# Patient Record
Sex: MALE | Race: BLACK OR AFRICAN AMERICAN | Employment: UNEMPLOYED | URBAN - METROPOLITAN AREA
[De-identification: names, ages, dates, MRNs, and addresses within clinical notes are randomized per-mention and may not be internally consistent; named-entity substitution may affect disease eponyms.]

---

## 2020-07-02 ENCOUNTER — ANESTHESIA EVENT (OUTPATIENT)
Dept: OPERATING ROOM | Age: 75
DRG: 026 | End: 2020-07-02
Payer: MEDICARE

## 2020-07-02 ENCOUNTER — HOSPITAL ENCOUNTER (INPATIENT)
Age: 75
LOS: 5 days | Discharge: HOME HEALTH | DRG: 026 | End: 2020-07-07
Attending: EMERGENCY MEDICINE | Admitting: FAMILY MEDICINE
Payer: MEDICARE

## 2020-07-02 ENCOUNTER — APPOINTMENT (OUTPATIENT)
Dept: GENERAL RADIOLOGY | Age: 75
DRG: 026 | End: 2020-07-02
Payer: MEDICARE

## 2020-07-02 ENCOUNTER — APPOINTMENT (OUTPATIENT)
Dept: CT IMAGING | Age: 75
DRG: 026 | End: 2020-07-02
Payer: MEDICARE

## 2020-07-02 ENCOUNTER — ANESTHESIA (OUTPATIENT)
Dept: OPERATING ROOM | Age: 75
DRG: 026 | End: 2020-07-02
Payer: MEDICARE

## 2020-07-02 VITALS — SYSTOLIC BLOOD PRESSURE: 148 MMHG | TEMPERATURE: 96.4 F | OXYGEN SATURATION: 100 % | DIASTOLIC BLOOD PRESSURE: 63 MMHG

## 2020-07-02 PROBLEM — S06.5XAA SUBDURAL HEMATOMA: Status: ACTIVE | Noted: 2020-07-02

## 2020-07-02 LAB
ABO/RH: NORMAL
ALBUMIN SERPL-MCNC: 3.9 G/DL (ref 3.5–5.2)
ALP BLD-CCNC: 61 U/L (ref 40–129)
ALT SERPL-CCNC: 11 U/L (ref 0–40)
ANGLE (CLOT STRENGTH): 70.2 DEGREE (ref 59–74)
ANION GAP SERPL CALCULATED.3IONS-SCNC: 12 MMOL/L (ref 7–16)
ANION GAP SERPL CALCULATED.3IONS-SCNC: 14 MMOL/L (ref 7–16)
ANTIBODY SCREEN: NORMAL
AST SERPL-CCNC: 14 U/L (ref 0–39)
BACTERIA: ABNORMAL /HPF
BASOPHILS ABSOLUTE: 0.02 E9/L (ref 0–0.2)
BASOPHILS RELATIVE PERCENT: 0.2 % (ref 0–2)
BILIRUB SERPL-MCNC: 0.3 MG/DL (ref 0–1.2)
BILIRUBIN URINE: NEGATIVE
BLOOD, URINE: NEGATIVE
BUN BLDV-MCNC: 18 MG/DL (ref 8–23)
BUN BLDV-MCNC: 19 MG/DL (ref 8–23)
CALCIUM SERPL-MCNC: 9.3 MG/DL (ref 8.6–10.2)
CALCIUM SERPL-MCNC: 9.6 MG/DL (ref 8.6–10.2)
CHLORIDE BLD-SCNC: 101 MMOL/L (ref 98–107)
CHLORIDE BLD-SCNC: 102 MMOL/L (ref 98–107)
CHOLESTEROL, TOTAL: 183 MG/DL (ref 0–199)
CLARITY: CLEAR
CO2: 21 MMOL/L (ref 22–29)
CO2: 24 MMOL/L (ref 22–29)
COLOR: YELLOW
CREAT SERPL-MCNC: 1 MG/DL (ref 0.7–1.2)
CREAT SERPL-MCNC: 1.2 MG/DL (ref 0.7–1.2)
EKG ATRIAL RATE: 55 BPM
EKG P AXIS: 68 DEGREES
EKG P-R INTERVAL: 146 MS
EKG Q-T INTERVAL: 464 MS
EKG QRS DURATION: 78 MS
EKG QTC CALCULATION (BAZETT): 443 MS
EKG R AXIS: 19 DEGREES
EKG T AXIS: 3 DEGREES
EKG VENTRICULAR RATE: 55 BPM
EOSINOPHILS ABSOLUTE: 0.09 E9/L (ref 0.05–0.5)
EOSINOPHILS RELATIVE PERCENT: 1.1 % (ref 0–6)
EPITHELIAL CELLS, UA: ABNORMAL /HPF
EPL-TEG: 0.1 % (ref 0–15)
G-TEG: 9.8 K D/SC (ref 4.5–11)
GFR AFRICAN AMERICAN: >60
GFR AFRICAN AMERICAN: >60
GFR NON-AFRICAN AMERICAN: >60 ML/MIN/1.73
GFR NON-AFRICAN AMERICAN: >60 ML/MIN/1.73
GLUCOSE BLD-MCNC: 104 MG/DL (ref 74–99)
GLUCOSE BLD-MCNC: 122 MG/DL (ref 74–99)
GLUCOSE URINE: NEGATIVE MG/DL
HBA1C MFR BLD: 5.8 % (ref 4–5.6)
HCT VFR BLD CALC: 36.5 % (ref 37–54)
HDLC SERPL-MCNC: 63 MG/DL
HEMOGLOBIN: 11.7 G/DL (ref 12.5–16.5)
IMMATURE GRANULOCYTES #: 0.01 E9/L
IMMATURE GRANULOCYTES %: 0.1 % (ref 0–5)
INR BLD: 1.1
K (CLOTTING TIME): 1.4 MIN (ref 1–3)
KETONES, URINE: NEGATIVE MG/DL
LDL CHOLESTEROL CALCULATED: 106 MG/DL (ref 0–99)
LEUKOCYTE ESTERASE, URINE: ABNORMAL
LY30 (FIBRINOLYSIS): 0.1 % (ref 0–8)
LYMPHOCYTES ABSOLUTE: 2.43 E9/L (ref 1.5–4)
LYMPHOCYTES RELATIVE PERCENT: 29.8 % (ref 20–42)
MA (MAX AMPLITUDE): 66.1 MM (ref 50–70)
MAGNESIUM: 1.9 MG/DL (ref 1.6–2.6)
MCH RBC QN AUTO: 29.2 PG (ref 26–35)
MCHC RBC AUTO-ENTMCNC: 32.1 % (ref 32–34.5)
MCV RBC AUTO: 91 FL (ref 80–99.9)
MONOCYTES ABSOLUTE: 0.58 E9/L (ref 0.1–0.95)
MONOCYTES RELATIVE PERCENT: 7.1 % (ref 2–12)
NEUTROPHILS ABSOLUTE: 5.03 E9/L (ref 1.8–7.3)
NEUTROPHILS RELATIVE PERCENT: 61.7 % (ref 43–80)
NITRITE, URINE: NEGATIVE
PDW BLD-RTO: 14.1 FL (ref 11.5–15)
PH UA: 6 (ref 5–9)
PHOSPHORUS: 3.3 MG/DL (ref 2.5–4.5)
PLATELET # BLD: 185 E9/L (ref 130–450)
PMV BLD AUTO: 9.8 FL (ref 7–12)
POTASSIUM SERPL-SCNC: 3.6 MMOL/L (ref 3.5–5)
POTASSIUM SERPL-SCNC: 3.9 MMOL/L (ref 3.5–5)
PROTEIN UA: NEGATIVE MG/DL
PROTHROMBIN TIME: 12.7 SEC (ref 9.3–12.4)
R (REACTION TIME): 4.5 MIN (ref 5–10)
RBC # BLD: 4.01 E12/L (ref 3.8–5.8)
RBC UA: ABNORMAL /HPF (ref 0–2)
REASON FOR REJECTION: NORMAL
REJECTED TEST: NORMAL
SODIUM BLD-SCNC: 137 MMOL/L (ref 132–146)
SODIUM BLD-SCNC: 137 MMOL/L (ref 132–146)
SPECIFIC GRAVITY UA: 1.02 (ref 1–1.03)
TOTAL CK: 104 U/L (ref 20–200)
TOTAL CK: 106 U/L (ref 20–200)
TOTAL PROTEIN: 6.8 G/DL (ref 6.4–8.3)
TRIGL SERPL-MCNC: 71 MG/DL (ref 0–149)
TROPONIN: <0.01 NG/ML (ref 0–0.03)
UROBILINOGEN, URINE: 0.2 E.U./DL
VLDLC SERPL CALC-MCNC: 14 MG/DL
WBC # BLD: 8.2 E9/L (ref 4.5–11.5)
WBC UA: ABNORMAL /HPF (ref 0–5)

## 2020-07-02 PROCEDURE — 2500000003 HC RX 250 WO HCPCS: Performed by: NEUROLOGICAL SURGERY

## 2020-07-02 PROCEDURE — 6370000000 HC RX 637 (ALT 250 FOR IP): Performed by: NURSE PRACTITIONER

## 2020-07-02 PROCEDURE — C1713 ANCHOR/SCREW BN/BN,TIS/BN: HCPCS | Performed by: NEUROLOGICAL SURGERY

## 2020-07-02 PROCEDURE — 2580000003 HC RX 258

## 2020-07-02 PROCEDURE — 84100 ASSAY OF PHOSPHORUS: CPT

## 2020-07-02 PROCEDURE — 2709999900 HC NON-CHARGEABLE SUPPLY: Performed by: NEUROLOGICAL SURGERY

## 2020-07-02 PROCEDURE — 2500000003 HC RX 250 WO HCPCS

## 2020-07-02 PROCEDURE — 93005 ELECTROCARDIOGRAM TRACING: CPT | Performed by: EMERGENCY MEDICINE

## 2020-07-02 PROCEDURE — 6360000002 HC RX W HCPCS

## 2020-07-02 PROCEDURE — 6360000002 HC RX W HCPCS: Performed by: FAMILY MEDICINE

## 2020-07-02 PROCEDURE — 81001 URINALYSIS AUTO W/SCOPE: CPT

## 2020-07-02 PROCEDURE — 6360000002 HC RX W HCPCS: Performed by: NURSE PRACTITIONER

## 2020-07-02 PROCEDURE — 2000000000 HC ICU R&B

## 2020-07-02 PROCEDURE — 86850 RBC ANTIBODY SCREEN: CPT

## 2020-07-02 PROCEDURE — 6360000002 HC RX W HCPCS: Performed by: NEUROLOGICAL SURGERY

## 2020-07-02 PROCEDURE — 2720000010 HC SURG SUPPLY STERILE: Performed by: NEUROLOGICAL SURGERY

## 2020-07-02 PROCEDURE — 3600000015 HC SURGERY LEVEL 5 ADDTL 15MIN: Performed by: NEUROLOGICAL SURGERY

## 2020-07-02 PROCEDURE — 3600000005 HC SURGERY LEVEL 5 BASE: Performed by: NEUROLOGICAL SURGERY

## 2020-07-02 PROCEDURE — 7100000001 HC PACU RECOVERY - ADDTL 15 MIN

## 2020-07-02 PROCEDURE — 80061 LIPID PANEL: CPT

## 2020-07-02 PROCEDURE — 99291 CRITICAL CARE FIRST HOUR: CPT | Performed by: SURGERY

## 2020-07-02 PROCEDURE — 80048 BASIC METABOLIC PNL TOTAL CA: CPT

## 2020-07-02 PROCEDURE — 85384 FIBRINOGEN ACTIVITY: CPT

## 2020-07-02 PROCEDURE — 97530 THERAPEUTIC ACTIVITIES: CPT

## 2020-07-02 PROCEDURE — 2500000003 HC RX 250 WO HCPCS: Performed by: NURSE PRACTITIONER

## 2020-07-02 PROCEDURE — 70450 CT HEAD/BRAIN W/O DYE: CPT

## 2020-07-02 PROCEDURE — 6370000000 HC RX 637 (ALT 250 FOR IP): Performed by: NEUROLOGICAL SURGERY

## 2020-07-02 PROCEDURE — 72170 X-RAY EXAM OF PELVIS: CPT

## 2020-07-02 PROCEDURE — 83036 HEMOGLOBIN GLYCOSYLATED A1C: CPT

## 2020-07-02 PROCEDURE — 86900 BLOOD TYPING SEROLOGIC ABO: CPT

## 2020-07-02 PROCEDURE — 71045 X-RAY EXAM CHEST 1 VIEW: CPT

## 2020-07-02 PROCEDURE — 2580000003 HC RX 258: Performed by: FAMILY MEDICINE

## 2020-07-02 PROCEDURE — 97162 PT EVAL MOD COMPLEX 30 MIN: CPT

## 2020-07-02 PROCEDURE — 7100000000 HC PACU RECOVERY - FIRST 15 MIN

## 2020-07-02 PROCEDURE — 82550 ASSAY OF CK (CPK): CPT

## 2020-07-02 PROCEDURE — 87081 CULTURE SCREEN ONLY: CPT

## 2020-07-02 PROCEDURE — 85025 COMPLETE CBC W/AUTO DIFF WBC: CPT

## 2020-07-02 PROCEDURE — 97166 OT EVAL MOD COMPLEX 45 MIN: CPT

## 2020-07-02 PROCEDURE — 85576 BLOOD PLATELET AGGREGATION: CPT

## 2020-07-02 PROCEDURE — 85347 COAGULATION TIME ACTIVATED: CPT

## 2020-07-02 PROCEDURE — 36415 COLL VENOUS BLD VENIPUNCTURE: CPT

## 2020-07-02 PROCEDURE — 3700000001 HC ADD 15 MINUTES (ANESTHESIA): Performed by: NEUROLOGICAL SURGERY

## 2020-07-02 PROCEDURE — 2580000003 HC RX 258: Performed by: NEUROLOGICAL SURGERY

## 2020-07-02 PROCEDURE — 2580000003 HC RX 258: Performed by: NURSE PRACTITIONER

## 2020-07-02 PROCEDURE — 86901 BLOOD TYPING SEROLOGIC RH(D): CPT

## 2020-07-02 PROCEDURE — 009430Z DRAINAGE OF INTRACRANIAL SUBDURAL SPACE WITH DRAINAGE DEVICE, PERCUTANEOUS APPROACH: ICD-10-PCS | Performed by: NEUROLOGICAL SURGERY

## 2020-07-02 PROCEDURE — 6370000000 HC RX 637 (ALT 250 FOR IP): Performed by: STUDENT IN AN ORGANIZED HEALTH CARE EDUCATION/TRAINING PROGRAM

## 2020-07-02 PROCEDURE — 36592 COLLECT BLOOD FROM PICC: CPT

## 2020-07-02 PROCEDURE — 72125 CT NECK SPINE W/O DYE: CPT

## 2020-07-02 PROCEDURE — 2580000003 HC RX 258: Performed by: EMERGENCY MEDICINE

## 2020-07-02 PROCEDURE — 3700000000 HC ANESTHESIA ATTENDED CARE: Performed by: NEUROLOGICAL SURGERY

## 2020-07-02 PROCEDURE — 85610 PROTHROMBIN TIME: CPT

## 2020-07-02 PROCEDURE — 99291 CRITICAL CARE FIRST HOUR: CPT

## 2020-07-02 PROCEDURE — 84484 ASSAY OF TROPONIN QUANT: CPT

## 2020-07-02 PROCEDURE — 80053 COMPREHEN METABOLIC PANEL: CPT

## 2020-07-02 PROCEDURE — 93010 ELECTROCARDIOGRAM REPORT: CPT | Performed by: INTERNAL MEDICINE

## 2020-07-02 PROCEDURE — 83735 ASSAY OF MAGNESIUM: CPT

## 2020-07-02 DEVICE — LOW PROFILE BURR HOLE COVER, W/TAB, 10MM
Type: IMPLANTABLE DEVICE | Site: CRANIAL | Status: FUNCTIONAL
Brand: UNIVERSAL NEURO 2

## 2020-07-02 DEVICE — SCREW, AXS, SELF-TAPPING
Type: IMPLANTABLE DEVICE | Site: CRANIAL | Status: FUNCTIONAL
Brand: UNIVERSAL NEURO 3

## 2020-07-02 RX ORDER — CEFAZOLIN SODIUM 1 G/3ML
INJECTION, POWDER, FOR SOLUTION INTRAMUSCULAR; INTRAVENOUS PRN
Status: DISCONTINUED | OUTPATIENT
Start: 2020-07-02 | End: 2020-07-02 | Stop reason: SDUPTHER

## 2020-07-02 RX ORDER — ONDANSETRON 2 MG/ML
4 INJECTION INTRAMUSCULAR; INTRAVENOUS EVERY 6 HOURS PRN
Status: DISCONTINUED | OUTPATIENT
Start: 2020-07-02 | End: 2020-07-07 | Stop reason: HOSPADM

## 2020-07-02 RX ORDER — HYDROCHLOROTHIAZIDE 12.5 MG/1
12.5 CAPSULE, GELATIN COATED ORAL DAILY
COMMUNITY

## 2020-07-02 RX ORDER — GLYCOPYRROLATE 1 MG/5 ML
SYRINGE (ML) INTRAVENOUS PRN
Status: DISCONTINUED | OUTPATIENT
Start: 2020-07-02 | End: 2020-07-02 | Stop reason: SDUPTHER

## 2020-07-02 RX ORDER — LIDOCAINE HYDROCHLORIDE AND EPINEPHRINE 10; 10 MG/ML; UG/ML
INJECTION, SOLUTION INFILTRATION; PERINEURAL PRN
Status: DISCONTINUED | OUTPATIENT
Start: 2020-07-02 | End: 2020-07-02 | Stop reason: ALTCHOICE

## 2020-07-02 RX ORDER — SODIUM CHLORIDE 9 MG/ML
INJECTION, SOLUTION INTRAVENOUS CONTINUOUS
Status: DISCONTINUED | OUTPATIENT
Start: 2020-07-02 | End: 2020-07-02

## 2020-07-02 RX ORDER — LEVETIRACETAM 500 MG/1
500 TABLET ORAL 2 TIMES DAILY
Status: DISCONTINUED | OUTPATIENT
Start: 2020-07-02 | End: 2020-07-07 | Stop reason: HOSPADM

## 2020-07-02 RX ORDER — HYDROCHLOROTHIAZIDE 25 MG/1
25 TABLET ORAL DAILY
Status: DISCONTINUED | OUTPATIENT
Start: 2020-07-02 | End: 2020-07-02

## 2020-07-02 RX ORDER — VANCOMYCIN HYDROCHLORIDE 500 MG/10ML
INJECTION, POWDER, LYOPHILIZED, FOR SOLUTION INTRAVENOUS PRN
Status: DISCONTINUED | OUTPATIENT
Start: 2020-07-02 | End: 2020-07-02 | Stop reason: ALTCHOICE

## 2020-07-02 RX ORDER — SODIUM CHLORIDE 0.9 % (FLUSH) 0.9 %
10 SYRINGE (ML) INJECTION PRN
Status: DISCONTINUED | OUTPATIENT
Start: 2020-07-02 | End: 2020-07-07 | Stop reason: HOSPADM

## 2020-07-02 RX ORDER — DEXTROSE AND SODIUM CHLORIDE 5; .45 G/100ML; G/100ML
INJECTION, SOLUTION INTRAVENOUS CONTINUOUS
Status: DISCONTINUED | OUTPATIENT
Start: 2020-07-02 | End: 2020-07-02

## 2020-07-02 RX ORDER — THIAMINE MONONITRATE (VIT B1) 100 MG
100 TABLET ORAL DAILY
Status: DISCONTINUED | OUTPATIENT
Start: 2020-07-02 | End: 2020-07-07 | Stop reason: HOSPADM

## 2020-07-02 RX ORDER — LISINOPRIL 5 MG/1
5 TABLET ORAL DAILY
COMMUNITY

## 2020-07-02 RX ORDER — HYDRALAZINE HYDROCHLORIDE 20 MG/ML
10 INJECTION INTRAMUSCULAR; INTRAVENOUS EVERY 10 MIN PRN
Status: DISCONTINUED | OUTPATIENT
Start: 2020-07-02 | End: 2020-07-07 | Stop reason: HOSPADM

## 2020-07-02 RX ORDER — LEVETIRACETAM 5 MG/ML
500 INJECTION INTRAVASCULAR EVERY 12 HOURS
Status: DISCONTINUED | OUTPATIENT
Start: 2020-07-02 | End: 2020-07-02 | Stop reason: SDUPTHER

## 2020-07-02 RX ORDER — DEXTROSE MONOHYDRATE 25 G/50ML
12.5 INJECTION, SOLUTION INTRAVENOUS PRN
Status: DISCONTINUED | OUTPATIENT
Start: 2020-07-02 | End: 2020-07-07 | Stop reason: HOSPADM

## 2020-07-02 RX ORDER — ACETAMINOPHEN 325 MG/1
650 TABLET ORAL EVERY 4 HOURS PRN
Status: DISCONTINUED | OUTPATIENT
Start: 2020-07-02 | End: 2020-07-07 | Stop reason: HOSPADM

## 2020-07-02 RX ORDER — FENTANYL CITRATE 50 UG/ML
INJECTION, SOLUTION INTRAMUSCULAR; INTRAVENOUS PRN
Status: DISCONTINUED | OUTPATIENT
Start: 2020-07-02 | End: 2020-07-02 | Stop reason: SDUPTHER

## 2020-07-02 RX ORDER — LIDOCAINE HYDROCHLORIDE 20 MG/ML
INJECTION, SOLUTION INTRAVENOUS PRN
Status: DISCONTINUED | OUTPATIENT
Start: 2020-07-02 | End: 2020-07-02 | Stop reason: SDUPTHER

## 2020-07-02 RX ORDER — LABETALOL HYDROCHLORIDE 5 MG/ML
10 INJECTION, SOLUTION INTRAVENOUS EVERY 10 MIN PRN
Status: DISCONTINUED | OUTPATIENT
Start: 2020-07-02 | End: 2020-07-07 | Stop reason: HOSPADM

## 2020-07-02 RX ORDER — HYDRALAZINE HYDROCHLORIDE 20 MG/ML
5 INJECTION INTRAMUSCULAR; INTRAVENOUS EVERY 4 HOURS PRN
Status: DISCONTINUED | OUTPATIENT
Start: 2020-07-02 | End: 2020-07-02

## 2020-07-02 RX ORDER — LISINOPRIL 20 MG/1
20 TABLET ORAL DAILY
Status: DISCONTINUED | OUTPATIENT
Start: 2020-07-02 | End: 2020-07-07 | Stop reason: HOSPADM

## 2020-07-02 RX ORDER — LEVETIRACETAM 5 MG/ML
500 INJECTION INTRAVASCULAR EVERY 12 HOURS
Status: DISCONTINUED | OUTPATIENT
Start: 2020-07-02 | End: 2020-07-02

## 2020-07-02 RX ORDER — DEXTROSE MONOHYDRATE 50 MG/ML
100 INJECTION, SOLUTION INTRAVENOUS PRN
Status: DISCONTINUED | OUTPATIENT
Start: 2020-07-02 | End: 2020-07-07 | Stop reason: HOSPADM

## 2020-07-02 RX ORDER — NICOTINE POLACRILEX 4 MG
15 LOZENGE BUCCAL PRN
Status: DISCONTINUED | OUTPATIENT
Start: 2020-07-02 | End: 2020-07-07 | Stop reason: HOSPADM

## 2020-07-02 RX ORDER — ROCURONIUM BROMIDE 10 MG/ML
INJECTION, SOLUTION INTRAVENOUS PRN
Status: DISCONTINUED | OUTPATIENT
Start: 2020-07-02 | End: 2020-07-02 | Stop reason: SDUPTHER

## 2020-07-02 RX ORDER — PROMETHAZINE HYDROCHLORIDE 25 MG/1
12.5 TABLET ORAL EVERY 6 HOURS PRN
Status: DISCONTINUED | OUTPATIENT
Start: 2020-07-02 | End: 2020-07-02

## 2020-07-02 RX ORDER — FOLIC ACID 1 MG/1
1 TABLET ORAL DAILY
Status: DISCONTINUED | OUTPATIENT
Start: 2020-07-02 | End: 2020-07-07 | Stop reason: HOSPADM

## 2020-07-02 RX ORDER — DEXAMETHASONE SODIUM PHOSPHATE 10 MG/ML
INJECTION INTRAMUSCULAR; INTRAVENOUS PRN
Status: DISCONTINUED | OUTPATIENT
Start: 2020-07-02 | End: 2020-07-02 | Stop reason: SDUPTHER

## 2020-07-02 RX ORDER — SODIUM CHLORIDE 9 MG/ML
INJECTION, SOLUTION INTRAVENOUS CONTINUOUS PRN
Status: DISCONTINUED | OUTPATIENT
Start: 2020-07-02 | End: 2020-07-02 | Stop reason: SDUPTHER

## 2020-07-02 RX ORDER — NEOSTIGMINE METHYLSULFATE 1 MG/ML
INJECTION, SOLUTION INTRAVENOUS PRN
Status: DISCONTINUED | OUTPATIENT
Start: 2020-07-02 | End: 2020-07-02 | Stop reason: SDUPTHER

## 2020-07-02 RX ORDER — LEVETIRACETAM 10 MG/ML
1000 INJECTION INTRAVASCULAR ONCE
Status: COMPLETED | OUTPATIENT
Start: 2020-07-02 | End: 2020-07-02

## 2020-07-02 RX ORDER — PROPOFOL 10 MG/ML
INJECTION, EMULSION INTRAVENOUS PRN
Status: DISCONTINUED | OUTPATIENT
Start: 2020-07-02 | End: 2020-07-02 | Stop reason: SDUPTHER

## 2020-07-02 RX ORDER — SODIUM CHLORIDE, SODIUM LACTATE, POTASSIUM CHLORIDE, CALCIUM CHLORIDE 600; 310; 30; 20 MG/100ML; MG/100ML; MG/100ML; MG/100ML
INJECTION, SOLUTION INTRAVENOUS CONTINUOUS PRN
Status: COMPLETED | OUTPATIENT
Start: 2020-07-02 | End: 2020-07-02

## 2020-07-02 RX ORDER — DOCUSATE SODIUM 100 MG/1
100 CAPSULE, LIQUID FILLED ORAL DAILY
Status: DISCONTINUED | OUTPATIENT
Start: 2020-07-02 | End: 2020-07-04

## 2020-07-02 RX ORDER — DIAPER,BRIEF,INFANT-TODD,DISP
EACH MISCELLANEOUS PRN
Status: DISCONTINUED | OUTPATIENT
Start: 2020-07-02 | End: 2020-07-02 | Stop reason: ALTCHOICE

## 2020-07-02 RX ORDER — SODIUM CHLORIDE 0.9 % (FLUSH) 0.9 %
10 SYRINGE (ML) INJECTION EVERY 12 HOURS SCHEDULED
Status: DISCONTINUED | OUTPATIENT
Start: 2020-07-02 | End: 2020-07-07 | Stop reason: HOSPADM

## 2020-07-02 RX ORDER — PANTOPRAZOLE SODIUM 40 MG/1
40 TABLET, DELAYED RELEASE ORAL
Status: DISCONTINUED | OUTPATIENT
Start: 2020-07-03 | End: 2020-07-07 | Stop reason: HOSPADM

## 2020-07-02 RX ORDER — IPRATROPIUM BROMIDE AND ALBUTEROL SULFATE 2.5; .5 MG/3ML; MG/3ML
1 SOLUTION RESPIRATORY (INHALATION) EVERY 4 HOURS PRN
Status: DISCONTINUED | OUTPATIENT
Start: 2020-07-02 | End: 2020-07-07 | Stop reason: HOSPADM

## 2020-07-02 RX ORDER — ONDANSETRON 2 MG/ML
INJECTION INTRAMUSCULAR; INTRAVENOUS PRN
Status: DISCONTINUED | OUTPATIENT
Start: 2020-07-02 | End: 2020-07-02 | Stop reason: SDUPTHER

## 2020-07-02 RX ADMIN — DEXAMETHASONE SODIUM PHOSPHATE 10 MG: 10 INJECTION INTRAMUSCULAR; INTRAVENOUS at 06:29

## 2020-07-02 RX ADMIN — SODIUM CHLORIDE: 9 INJECTION, SOLUTION INTRAVENOUS at 06:21

## 2020-07-02 RX ADMIN — ONDANSETRON HYDROCHLORIDE 4 MG: 2 INJECTION, SOLUTION INTRAMUSCULAR; INTRAVENOUS at 07:12

## 2020-07-02 RX ADMIN — Medication 100 MG: at 10:17

## 2020-07-02 RX ADMIN — LEVETIRACETAM 1000 MG: 10 INJECTION INTRAVENOUS at 08:13

## 2020-07-02 RX ADMIN — SODIUM CHLORIDE: 9 INJECTION, SOLUTION INTRAVENOUS at 08:52

## 2020-07-02 RX ADMIN — SODIUM CHLORIDE, PRESERVATIVE FREE 10 ML: 5 INJECTION INTRAVENOUS at 08:55

## 2020-07-02 RX ADMIN — ACETAMINOPHEN 650 MG: 325 TABLET ORAL at 15:56

## 2020-07-02 RX ADMIN — FOLIC ACID 1 MG: 1 TABLET ORAL at 10:16

## 2020-07-02 RX ADMIN — CEFAZOLIN 2000 MG: 1 INJECTION, POWDER, FOR SOLUTION INTRAMUSCULAR; INTRAVENOUS at 06:40

## 2020-07-02 RX ADMIN — SODIUM CHLORIDE, PRESERVATIVE FREE 10 ML: 5 INJECTION INTRAVENOUS at 20:33

## 2020-07-02 RX ADMIN — LIDOCAINE HYDROCHLORIDE 80 MG: 20 INJECTION, SOLUTION INTRAVENOUS at 06:29

## 2020-07-02 RX ADMIN — ACETAMINOPHEN 650 MG: 325 TABLET ORAL at 19:50

## 2020-07-02 RX ADMIN — CEFAZOLIN 1 G: 1 INJECTION, POWDER, FOR SOLUTION INTRAMUSCULAR; INTRAVENOUS at 15:48

## 2020-07-02 RX ADMIN — LEVETIRACETAM 500 MG: 500 TABLET, FILM COATED ORAL at 20:34

## 2020-07-02 RX ADMIN — FENTANYL CITRATE 100 MCG: 50 INJECTION, SOLUTION INTRAMUSCULAR; INTRAVENOUS at 06:29

## 2020-07-02 RX ADMIN — HYDRALAZINE HYDROCHLORIDE 10 MG: 20 INJECTION INTRAMUSCULAR; INTRAVENOUS at 23:09

## 2020-07-02 RX ADMIN — Medication 0.6 MG: at 07:13

## 2020-07-02 RX ADMIN — ROCURONIUM BROMIDE 30 MG: 10 INJECTION, SOLUTION INTRAVENOUS at 06:29

## 2020-07-02 RX ADMIN — Medication 0.2 MG: at 06:29

## 2020-07-02 RX ADMIN — HYDROCHLOROTHIAZIDE 25 MG: 25 TABLET ORAL at 10:17

## 2020-07-02 RX ADMIN — Medication 3 MG: at 07:13

## 2020-07-02 RX ADMIN — HYDRALAZINE HYDROCHLORIDE 10 MG: 20 INJECTION INTRAMUSCULAR; INTRAVENOUS at 11:22

## 2020-07-02 RX ADMIN — PROPOFOL 120 MG: 10 INJECTION, EMULSION INTRAVENOUS at 06:29

## 2020-07-02 ASSESSMENT — PULMONARY FUNCTION TESTS
PIF_VALUE: 16
PIF_VALUE: 16
PIF_VALUE: 0
PIF_VALUE: 15
PIF_VALUE: 15
PIF_VALUE: 1
PIF_VALUE: 0
PIF_VALUE: 16
PIF_VALUE: 16
PIF_VALUE: 15
PIF_VALUE: 16
PIF_VALUE: 0
PIF_VALUE: 16
PIF_VALUE: 16
PIF_VALUE: 1
PIF_VALUE: 16
PIF_VALUE: 0
PIF_VALUE: 0
PIF_VALUE: 16
PIF_VALUE: 29
PIF_VALUE: 1
PIF_VALUE: 0
PIF_VALUE: 0
PIF_VALUE: 16
PIF_VALUE: 15
PIF_VALUE: 0
PIF_VALUE: 16
PIF_VALUE: 3
PIF_VALUE: 16
PIF_VALUE: 16
PIF_VALUE: 15
PIF_VALUE: 5
PIF_VALUE: 0
PIF_VALUE: 0
PIF_VALUE: 15
PIF_VALUE: 16
PIF_VALUE: 15
PIF_VALUE: 3
PIF_VALUE: 16
PIF_VALUE: 1
PIF_VALUE: 1
PIF_VALUE: 16
PIF_VALUE: 0
PIF_VALUE: 0
PIF_VALUE: 3
PIF_VALUE: 16
PIF_VALUE: 2
PIF_VALUE: 16
PIF_VALUE: 0
PIF_VALUE: 16
PIF_VALUE: 16
PIF_VALUE: 0
PIF_VALUE: 15
PIF_VALUE: 16
PIF_VALUE: 20
PIF_VALUE: 16
PIF_VALUE: 0
PIF_VALUE: 15
PIF_VALUE: 16
PIF_VALUE: 0
PIF_VALUE: 15
PIF_VALUE: 28
PIF_VALUE: 16
PIF_VALUE: 16
PIF_VALUE: 15
PIF_VALUE: 1
PIF_VALUE: 0
PIF_VALUE: 1
PIF_VALUE: 16
PIF_VALUE: 16
PIF_VALUE: 0
PIF_VALUE: 16

## 2020-07-02 ASSESSMENT — PAIN DESCRIPTION - PAIN TYPE: TYPE: ACUTE PAIN;SURGICAL PAIN

## 2020-07-02 ASSESSMENT — PAIN SCALES - GENERAL
PAINLEVEL_OUTOF10: 4
PAINLEVEL_OUTOF10: 10
PAINLEVEL_OUTOF10: 10

## 2020-07-02 ASSESSMENT — PAIN DESCRIPTION - LOCATION: LOCATION: HEAD

## 2020-07-02 NOTE — PLAN OF CARE
Problem: Skin Integrity:  Goal: Will show no infection signs and symptoms  Description: Will show no infection signs and symptoms  Outcome: Met This Shift  Goal: Absence of new skin breakdown  Description: Absence of new skin breakdown  Outcome: Met This Shift     Problem: Falls - Risk of:  Goal: Will remain free from falls  Description: Will remain free from falls  Outcome: Met This Shift  Goal: Absence of physical injury  Description: Absence of physical injury  Outcome: Met This Shift     Problem: Discharge Planning:  Goal: Participates in care planning  Description: Participates in care planning  Outcome: Met This Shift     Problem: Airway Clearance - Ineffective:  Goal: Ability to maintain a clear airway will improve  Description: Ability to maintain a clear airway will improve  Outcome: Met This Shift     Problem: Anxiety/Stress:  Goal: Level of anxiety will decrease  Description: Level of anxiety will decrease  Outcome: Met This Shift     Problem: Aspiration:  Goal: Absence of aspiration  Description: Absence of aspiration  Outcome: Met This Shift     Problem:  Bowel Function - Altered:  Goal: Bowel elimination is within specified parameters  Description: Bowel elimination is within specified parameters  Outcome: Met This Shift     Problem: Cardiac Output - Decreased:  Goal: Hemodynamic stability will improve  Description: Hemodynamic stability will improve  Outcome: Met This Shift     Problem: Fluid Volume - Imbalance:  Goal: Absence of imbalanced fluid volume signs and symptoms  Description: Absence of imbalanced fluid volume signs and symptoms  Outcome: Met This Shift     Problem: Gas Exchange - Impaired:  Goal: Levels of oxygenation will improve  Description: Levels of oxygenation will improve  Outcome: Met This Shift     Problem: Mental Status - Impaired:  Goal: Mental status will be restored to baseline  Description: Mental status will be restored to baseline  Outcome: Met This Shift     Problem: Skin Integrity - Impaired:  Goal: Will show no infection signs and symptoms  Description: Will show no infection signs and symptoms  Outcome: Met This Shift  Goal: Absence of new skin breakdown  Description: Absence of new skin breakdown  Outcome: Met This Shift     Problem: Sleep Pattern Disturbance:  Goal: Appears well-rested  Description: Appears well-rested  Outcome: Met This Shift

## 2020-07-02 NOTE — CONSULTS
TRAUMA CONSULT NOTE  Surgical Resident/Advance Practice Nurse  7/2/2020  3:49 AM    PRIMARY SURVEY    CHIEF COMPLAINT:  Trauma consult. Injury occurred unknown time frame. Patient was last seen by his neighbors 4 days ago. Today they went to check on him and he did not answer door. EMS called. Found to have expressive aphasia, right sided facial droop and RUE and RLE weakness. Patient's PMH is unknown. He is able to answer \"yes\" and \"no\" seemingly appropriately to questions but difficult to give more than a 1 word answer. He intially presented to [facility], was found to have large L septated SDH with mixed density and 12mm L to R shift with acute on subacute components. Patient denies fall in past 24hrs. He thinks he may have fallen sometime in the past 4 days but is not sure. When asked, thinks he may have had a fall several weeks ago. Denies taking blood thinners. Unknown LOC. Patient transferred to [facility] for neurosurgical evaluation. Dr. Geeta Hernández already notified @ OSH. Patient received 1g [facility] @ 7806. CBC:  WBC 8.1, Hgb 13.7, Plt 220  BUN 8/Cr 1.17, Na 139, K 3.6, Glucose 106     AIRWAY:   Airway Normal  EMS ETT Absent  Noisy respirations Absent  Retractions: Absent  Vomiting/bleeding: Absent      BREATHING:    Midaxillary breath sound left:  Normal  Midaxillary breath sound right:  Normal    Cough sound intensity:  good   FiO2: on room air  [facility] .       CIRCULATION:   Femerol pulse intensity: Strong  Palpebral conjunctiva: Pink       Vitals:    07/02/20 0338   BP: (!) 155/73   Pulse: 62   Resp: 15   Temp:    SpO2: 96%       Vitals:    07/02/20 0302 07/02/20 0338   BP: (!) 157/84 (!) 155/73   Pulse: 58 62   Resp: 16 15   Temp: 97.4 °F (36.3 °C)    SpO2: 98% 96%        FAST EXAM: not performed    Central Nervous System    GCS Initial 15 minutes   Eye  Motor  Verbal 4 - Opens eyes on own  6 - Follows simple motor commands  4 - Seems confused, disoriented 4 - Opens eyes on own  6 - Follows simple motor commands  4 - Seems confused, disoriented     Neuromuscular blockade: No  Pupil size:  Left 2 mm    Right 2 mm  Pupil reaction: Yes    Wiggles fingers: Left Yes Right Yes, weakly  Wiggles toes: Left Yes   Right No    Hand grasp:   Left  Present      Right  Weak  Plantar flexion: Left  Present      Right   Absent    Loss of consciousness:  unknown  History Obtained From:  Patient & EMS  Private Medical Doctor: unknown    Pre-exisiting Medical History:  Unknown, patient denies taking medicines    Conditions: unknown    Medications: says \"no\" to taking medication, blood thinners    Allergies: unknown    Social History:   Tobacco use:  Admits to smoking, unclear how much 2/2 expressive aphasia  Alcohol use:  history unreliable, admits to drinking several days weekly but states not daily  Illicit drug use:  no history of illicit drug use    Past Surgical History:  Possible prior appendectomy, scars from laparoscopic inguinal hernia repair    Anticoagulant use:  No   Antiplatelet use:    No     NSAID use in last 72 hours: denies  Taken PCN in past:  unknown  Last food/drink: unknown  Last tetanus: unknown    Family History:   Not pertinent to presenting problem. Complaints:   Head:  None  Neck:   None  Chest:   None  Back:   None  Abdomen:   None  Extremities:   None  Comments: denies physical complaints of pain    Review of systems:  All negative unless otherwise noted. SECONDARY SURVEY  Head/scalp: Atraumatic    Face: Atraumatic    Eyes/ears/nose: Atraumatic    Pharynx/mouth: Atraumatic    Neck: Atraumatic     Cervical spine tenderness:   Cervical collar not indicated  Pain:  none  ROM:  Not indicated     Chest wall:  Atraumatic    Heart:  Regular rate & rhythm    Abdomen: Atraumatic. Soft ND  Tenderness:  none    Pelvis: Atraumatic  Tenderness: none    Thoracolumbar spine: Atraumatic  Tenderness:  none    Genitourinary:  Atraumatic. No blood or urine noted    Rectum: Atraumatic.   No blood noted. Perineum: Atraumatic. No blood or urine noted. Extremities:   Sensory normal  Motor abnormal: decreased strength to RUE 3/5. Decreased strength to RLE 1/5    Distal Pulses  Left arm normal  Right arm normal  Left leg normal  Right leg normal    Capillary refill  Left arm normal  Right arm normal  Left leg normal  Right leg normal    Procedures in ED:  none    In the event of Emergency Blood Transfusion:  Due to the critical condition of this patient, I request the immediate release of blood products for emergency transfusion secondary to shock. I understand the increased risks incurred by the lack of complete transfusion testing. Radiology: Chest Xray , Pelvic Xray , CT Head  and CT Cervical spine     Consultations:  Neurosurgery, additional consultants pending further scans    Admission/Diagnosis: SDH, likely subacute    Plan of Treatment:  Patient does not have any signs of acute traumatic injury. He denies fall or injury within the past 24hrs.   Consult Medicine for admission  Pending repeat CT head  Received Keppra 1g IV @ 0144, continue BID  NSG consult, Honorio Manzanares already aware and planning for possible OR in am  CXR, PXR and CT Cspine to complete trauma evaluation      Plan discussed with Dr. Ander Soto at 7/2/2020 on 3:49 AM    Electronically signed by Sheila Torres DO on 7/2/2020 at 3:49 AM

## 2020-07-02 NOTE — H&P
for DM, CAD, Cancer, CVA. Positive as follows\"  No family history on file. REVIEW OF SYSTEMS:   Pertinent positives as noted in the HPI. All other systems reviewed and negative. PHYSICAL EXAM:  BP (!) 142/75   Pulse 55   Temp 97.6 °F (36.4 °C) (Oral)   Resp 15   Ht 6' 2\" (1.88 m)   Wt 160 lb (72.6 kg)   SpO2 100%   BMI 20.54 kg/m²   General appearance: No acute distress, breathing without difficulty  HEENT: Normal cephalic, atraumatic. EOMI. Neck: Supple, trachea midline  Respiratory: Clear to auscultation bilaterally  Cardiovascular: Regular rate and rhythm  Abdomen: Soft, nontender, nondistended  Musculoskeletal: Normal range of motion no deformities  Skin: Normal skin color. No rashes or lesions. Neurologic: Alert and oriented. Confused to recent events. No focal neurological deficits identified  Psychiatric: Alert and oriented, thought content appropriate, normal insight    CBC:   Recent Labs     07/02/20  0306   WBC 8.2   RBC 4.01   HGB 11.7*   HCT 36.5*   MCV 91.0   RDW 14.1        BMP:   Recent Labs     07/02/20  0306      K 3.9      CO2 24   BUN 19   CREATININE 1.2     LFT:  Recent Labs     07/02/20  0306   PROT 6.8   ALKPHOS 61   ALT 11   AST 14   BILITOT 0.3     CE:  Recent Labs     07/02/20  0306   CKTOTAL 106   TROPONINI <0.01     PT/INR:   Recent Labs     07/02/20  0306   INR 1.1     BNP: No results for input(s): BNP in the last 72 hours.   ESR: No results found for: SEDRATE  CRP: No results found for: CRP  D Dimer: No results found for: DDIMER   Folate and B12: No results found for: TQEIJHEX03, No results found for: FOLATE  Lactic Acid: No results found for: LACTA  Thyroid Studies: No results found for: TSH, O5JIIBS, A2JQAXG, THYROIDAB    Oupatient labs:  Lab Results   Component Value Date    INR 1.1 07/02/2020    LABA1C 5.8 (H) 07/02/2020       Urinalysis:  No results found for: NITRU, WBCUA, BACTERIA, RBCUA, BLOODU, SPECGRAV, GLUCOSEU    Imaging:  Xr Pelvis (1-2 Views)    Result Date: 2020  Patient MRN:  [id number] : 1945 Age: 70 years Gender: Male Order Date:  2020 4:15 AM EXAM: XR PELVIS (1-2 VIEWS) NUMBER OF IMAGES:  1 INDICATION:  Acute fall with pelvic trauma. SDH SDH COMPARISON: None FINDINGS: The bones are aligned anatomically. No evidence of fracture or dislocation. Normal mineralization. Soft tissues unremarkable. Pelvis is rotated to the left. No significant abnormal findings. Positioning variance from ideal.    Ct Head Wo Contrast    Result Date: 2020  Patient MRN: [id number] : 1945 Age:  70 years Gender: Male Order Date: 2020 4:30 AM Exam: CT HEAD WO CONTRAST Number of Images: 153 views Indication:  Patient discovered unconscious. Left subdural hematoma. Follow-up. Comparison: 2020, 0048 hours. TECHNIQUE: Region of study: Head. CT images acquired without intravenous contrast. One or more of these dose optimization techniques were utilized: Automated exposure control; mA and/or kV adjustment per patient size (includes targeted exams where dose is matched to clinical indication); or iterative reconstruction. FINDINGS: Left-sided acute on subacute subdural hematoma again noted. The quantity of dense rebleed appears slightly greater. The degree of left-to-right midline shift has modestly increased from roughly 7 mm to 9 mm. The bony calvarium is unremarkable. ALERT:  THIS IS AN ABNORMAL REPORT Evidence of some interval progression of subdural hemorrhage on the left greater quantity of dense rebleed compared to the prior study. Left-to-right midline shift appears modestly increased. Ct Cervical Spine Wo Contrast    Result Date: 2020  Patient MRN: [id number] : 1945 Age:  70 years Gender: Male Order Date: 2020 3:00 AM Exam: CT CERVICAL SPINE WO CONTRAST Number of Images: 103 views Indication:  Patient discovered unconscious. Suspected C-spine trauma. Comparison: None.  TECHNIQUE: Region of study: Cervical Prophylaxis: []Lovenox []Heparin []PCD [] [address]  []Encouraged ambulation    Disposition: []Med/Surg [] Intermediate [] ICU/CCU  Admit status: [] Observation [] Inpatient     +++++++++++++++++++++++++++++++++++++++++++++++++  [address], [place]  +++++++++++++++++++++++++++++++++++++++++++++++++  NOTE: This report was transcribed using voice recognition software. Every effort was made to ensure accuracy; however, inadvertent computerized transcription errors may be present.

## 2020-07-02 NOTE — OP NOTE
[address]                  [address] [id number],  [facility]                                OPERATIVE REPORT    PATIENT NAME: Jayla Zamora                    :        1945  MED REC NO:   [id number]                            ROOM:       6853  ACCOUNT NO:   [de-identified]                           ADMIT DATE: 2020  PROVIDER:     Vickie Jaimes MD    DATE OF PROCEDURE:  2020    SURGEON:  Vickie Jaimes MD    PREOPERATIVE DIAGNOSIS:  Large left subdural hematoma with midline shift  and mass effect. POSTOPERATIVE DIAGNOSIS:  Large left subdural hematoma with midline  shift and mass effect. PROCEDURE:  Left tameka hole craniotomy for the evacuation and drainage of  subdural hematoma. TECHNIQUE:  The patient was placed on the operating room table in supine  position and after satisfactory endotracheal general anesthesia had been  administered, the patient's head was turned to the right side, so the  left side was facing up. The left scalp had been shaved and prepared  with Betadine scrub and solution and routinely draped. The incision was  2.5 cm long and was about 5 cm from the midline and just at the coronal  suture line. The incision was taken down through the skin and  subcutaneous tissue and aponeurosis. The edges of the scalp were  retracted, thus exposing the frontal bone. Using a [place] drill, a  tameka hole was made in the center of the exposed bone. Hemostasis was  secured in this tameka hole. Then, cruciate incision was made in the dura  and the old dark subdural hematoma came out under lot of pressure. The  subdural space was irrigated, until the fluid became pinkish. A small incision was made anterior to the scalp incision , tameka hole,      craniotomy site. A ventricular catheter was brought through a  subcutaneous tunnel into the area of the tameka hole. The tip of the  catheter was inserted into the subdural space.

## 2020-07-02 NOTE — ED PROVIDER NOTES
HPI:  7/2/20, Time: 2:51 AM EDT         Ricky Elizabeth is a 70 y.o. male presenting to the ED for abnormal CT finding, beginning days ago. The complaint has been persistent, moderate in severity, and worsened by nothing. Patient was brought from outlying facility due to abnormal CT. Patient was last seen normal 4 days ago. Patient was found on the ground. Patient was altered outlying facility and had weakness on right side. Patient was diagnosed with subdural hematoma. Patient unable to give history and unable to state when he had fallen. Or if he did fall. Patient has had no episodes of emesis. ROS:   Pertinent positives and negatives are stated within HPI, all other systems reviewed and are negative.  --------------------------------------------- PAST HISTORY ---------------------------------------------  Past Medical History:  has no past medical history on file. Past Surgical History:  has no past surgical history on file. Social History:      Family History: family history is not on file. The patients home medications have been reviewed. Allergies: Patient has no allergy information on record.    ---------------------------------------------------PHYSICAL EXAM--------------------------------------    Constitutional/General: Alert and oriented to person only, having expressive aphasia  Head: Normocephalic and atraumatic  Eyes: PERRL, EOMI  Mouth: Oropharynx clear, handling secretions, no trismus  Neck: Supple, full ROM, non tender to palpation in the midline, no stridor, no crepitus, no meningeal signs  Pulmonary: Lungs clear to auscultation bilaterally, no wheezes, rales, or rhonchi. Not in respiratory distress  Cardiovascular:  Regular rate. Regular rhythm. No murmurs, gallops, or rubs. 2+ distal pulses  Chest: no chest wall tenderness  Abdomen: Soft. Non tender. Non distended. +BS. No rebound, guarding, or rigidity. No pulsatile masses appreciated.   Musculoskeletal: Moves all extremities except for right side unable to raise right leg. Patient does move right arm slightly. Skin: warm and dry. No rashes. Neurologic: Oriented to person only. Patient weak on right side. Patient moves left upper and lower extreme without difficulty  Psych: Normal Affect    -------------------------------------------------- RESULTS -------------------------------------------------  I have personally reviewed all laboratory and imaging results for this patient. Results are listed below.      LABS:  Results for orders placed or performed during the hospital encounter of 07/02/20   CBC auto differential   Result Value Ref Range    WBC 8.2 4.5 - 11.5 E9/L    RBC 4.01 3.80 - 5.80 E12/L    Hemoglobin 11.7 (L) 12.5 - 16.5 g/dL    Hematocrit 36.5 (L) 37.0 - 54.0 %    MCV 91.0 80.0 - 99.9 fL    MCH 29.2 26.0 - 35.0 pg    MCHC 32.1 32.0 - 34.5 %    RDW 14.1 11.5 - 15.0 fL    Platelets 806 964 - 777 E9/L    MPV 9.8 7.0 - 12.0 fL    Neutrophils % 61.7 43.0 - 80.0 %    Immature Granulocytes % 0.1 0.0 - 5.0 %    Lymphocytes % 29.8 20.0 - 42.0 %    Monocytes % 7.1 2.0 - 12.0 %    Eosinophils % 1.1 0.0 - 6.0 %    Basophils % 0.2 0.0 - 2.0 %    Neutrophils Absolute 5.03 1.80 - 7.30 E9/L    Immature Granulocytes # 0.01 E9/L    Lymphocytes Absolute 2.43 1.50 - 4.00 E9/L    Monocytes Absolute 0.58 0.10 - 0.95 E9/L    Eosinophils Absolute 0.09 0.05 - 0.50 E9/L    Basophils Absolute 0.02 0.00 - 0.20 E9/L   Comprehensive Metabolic Panel   Result Value Ref Range    Sodium 137 132 - 146 mmol/L    Potassium 3.9 3.5 - 5.0 mmol/L    Chloride 101 98 - 107 mmol/L    CO2 24 22 - 29 mmol/L    Anion Gap 12 7 - 16 mmol/L    Glucose 104 (H) 74 - 99 mg/dL    BUN 19 8 - 23 mg/dL    CREATININE 1.2 0.7 - 1.2 mg/dL    GFR Non-African American >60 >=60 mL/min/1.73    GFR African American >60     Calcium 9.3 8.6 - 10.2 mg/dL    Total Protein 6.8 6.4 - 8.3 g/dL    Alb 3.9 3.5 - 5.2 g/dL    Total Bilirubin 0.3 0.0 - 1.2 mg/dL    Alkaline Phosphatase 61 40 - 129 U/L    ALT 11 0 - 40 U/L    AST 14 0 - 39 U/L   Protime-INR   Result Value Ref Range    Protime 12.7 (H) 9.3 - 12.4 sec    INR 1.1    CK   Result Value Ref Range    Total  20 - 200 U/L   Troponin   Result Value Ref Range    Troponin <0.01 0.00 - 0.03 ng/mL   TEG lab test   Result Value Ref Range    R (Reaction Time) 4.5 (L) 5.0 - 10.0 min    K (Clotting Time) 1.4 1.0 - 3.0 min    Angle (Clot Strength) 70.2 59.0 - 74.0 degree    MA (Max Amplitude) 66.1 50.0 - 70.0 mm    G-TEG 9.8 4.5 - 11.0 K d/sc    EPL-TEG 0.1 0.0 - 15.0 %    LY30 (Fibrinolysis) 0.1 0.0 - 8.0 %   EKG 12 Lead   Result Value Ref Range    Ventricular Rate 55 BPM    Atrial Rate 55 BPM    P-R Interval 146 ms    QRS Duration 78 ms    Q-T Interval 464 ms    QTc Calculation (Bazett) 443 ms    P Axis 68 degrees    R Axis 19 degrees    T Axis 3 degrees   TYPE AND SCREEN   Result Value Ref Range    ABO/Rh O POS     Antibody Screen NEG        RADIOLOGY:  Interpreted by Radiologist.  CT Cervical Spine WO Contrast   Final Result   No acute fracture or dislocation. 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 Most severe is C4-5. Multilevel intervertebral foraminal stenoses from spurring. CT Head WO Contrast   Final Result   ALERT:  THIS IS AN ABNORMAL REPORT      Evidence of some interval progression of subdural hemorrhage on the   left greater quantity of dense rebleed compared to the prior study. Left-to-right midline shift appears modestly increased. XR PELVIS (1-2 VIEWS)   Final Result   No significant abnormal findings. Positioning variance from ideal.      XR CHEST PORTABLE   Final Result   Likely biapical scarring. Nothing else active. EKG:  This EKG is signed and interpreted by me.     Rate: 55  Rhythm: Sinus  Interpretation: non-specific EKG  Comparison: no previous EKG available    This X-Ray is independently viewed and interpreted by me:   - Study: Chest X-Ray   - Number of Views: 1  - Findings: Mediastinum is normal, No effusion, No cardiomegaly and No pneumothorax    ------------------------- NURSING NOTES AND VITALS REVIEWED ---------------------------   The nursing notes within the ED encounter and vital signs as below have been reviewed by myself. /70   Pulse 57   Temp 97.4 °F (36.3 °C)   Resp 12   SpO2 99%   Oxygen Saturation Interpretation: Normal    The patients available past medical records and past encounters were reviewed. ------------------------------ ED COURSE/MEDICAL DECISION MAKING----------------------  Medications   0.9 % sodium chloride infusion ( Intravenous New Bag 7/2/20 0500)             Medical Decision Making:    labs reviewed from outlying facility CT noted and shows large left subdural hematoma high density components consistent with acute hemorrhage a 12 mm left to right midline shift  I did review CT and patient did not have CT cervical spine patient is altered here CT cervical spine will be ordered to complete work-up. We attempted to call family to notify them of his condition and patient will be going to our unable to reach family. Please note that nursing staff called  Re-Evaluations:             Re-evaluation. Patients symptoms show no change  Patient's vital signs noted and stable. Patient rechecked several times unchanged still having expressive aphasia and weak on right side. Consultations:             Call placed to Dr. Federico Malhotra he accepted patient from [facility].  Dr. Federico Malhotra requested trauma service evaluate patient. He also wanted repeat CAT scan of brain. CT of cervical spine also ordered  Dr. Federico Malhotra will take patient to OR this morning    I did speak to trauma service and they requested that medicine admit and then as a consult. Call was placed to on-call internal medicine.     Critical Care:     Please note that the withdrawal or failure to initiate urgent interventions for this patient

## 2020-07-02 NOTE — PROGRESS NOTES
 Intimate partner violence     Fear of current or ex partner: Not on file     Emotionally abused: Not on file     Physically abused: Not on file     Forced sexual activity: Not on file   Other Topics Concern    Not on file   Social History Narrative    Not on file       Family History:   No family history on file. VITAL SIGNS:   BP (!) 142/75   Pulse 55   Temp 97.6 °F (36.4 °C) (Oral)   Resp 15   Ht 6' 2\" (1.88 m)   Wt 160 lb (72.6 kg)   SpO2 100%   BMI 20.54 kg/m²     PHYSICAL EXAM:    General appearance:  Comfortable. Pain Description: none  GCS:    4 - Opens eyes on own   6 - Follows simple motor commands  5 - Alert and oriented    Pupil size:  Left 3 mm    Right 3 mm  Pupil reaction: Yes  Wiggles fingers: Left Yes Right Yes  Hand grasp:   Left normal    Right decreased  Wiggles toes: Left Yes    Right Yes  Plantar flexion: Left normal   Right decreased    CONSTITUTIONAL: no acute distress, lying in hospital bed. NEUROLOGIC: PERRL, oriented x 4, confused to recent events  CARDIOVASCULAR: S1 S2, regular rate, regular rhythm, no murmur/gallop/rub. Monitor: Sinus sinan  PULMONARY: no rhonchi/rales/wheezes, no use of accessory muscles, RA  RENAL: voiding  ABDOMEN: soft, nontender, nondistended, nontympanic, no masses, no organomegaly, normal bowel sounds   MUSCULOSKELETAL: moves all extremities purposefully, weaker with right upper and lower extremities  SKIN/EXTREMITIES: no rashes/ecchymosis, no edema/clubbing, warm/dry, good capillary refill     Assessment & Plan:       · Neuro:  SDH s/p tameka hole crani, Hx ETOH, . Monitor neuro status, Neurosurgery following, monitor drainage, Keppra, thiamine, folate   · CV: No acute issues HX of HTN, hyperlipidemia. Monitor hemodynamics. BP goal < 140. PRN hydralazine & labetalol. Restart home hydrochlorothiazide, hold lisinopril and monitor creat  · Pulm: No acute issues, Hx copd, smoker. Monitor RR & SpO2.   Pulmonary hygiene, duonebs  · GI: No acute issues. Bedside swallow. May start soft diet if passed - will need his dentures. Monitor bowel function. · Renal:  No acute issues. Monitor BUN & Cr. Monitor electrolytes & replace as needed. Monitor urine output. · ID: No acute issues. · Endocrine: Hyperglycemia. Monitor BS. · MSK: No acute issues. PT/OT  · Heme: No acute issues. Monitor CBC. Bowel regime: colace   Pain control/Sedation: Tylenol  DVT prophylaxis: SCDs. No Lovenox/heparin until ok with neurosurgery. GI prophylaxis: Diet  Mouth/Eye care: As needed   [place]: None  Ancillary consults: Trauma, Medicine, Neurosurgery   Family update:  Will update when available, questions answered for patient   Code status:  Full    Disposition:  NSICU      Electronically signed by JOHN Segura CNP on 7/2/2020 at 9:03 AM

## 2020-07-02 NOTE — PROGRESS NOTES
Physical Therapy  Physical Therapy Initial Assessment     Name: Odilia Olson  :   MRN: [id number]    Referring Provider:  JOHN Pearson CNP    Date of Service: 2020    Evaluating PT:  Jonathan Humphries PT, DPT [id number]    Room #:  5958/8809-G  Diagnosis:  SDH  Precautions: Falls, BP < 140, Subdural drain  Procedure/Surgery:   tameka hole craniotomy   PMHx/PSHx:  COPD, HLD, HTN  Equipment Needs:  TBD    SUBJECTIVE:    Pt lives alone in a 1 story home with level entry. Pt ambulated with no device and was independent PTA. OBJECTIVE:   Initial Evaluation  Date: 20 Treatment Short Term/ Long Term   Goals   AM-PAC 6 Clicks      Was pt agreeable to Eval/treatment? Yes     Does pt have pain?  No c/o pain     Bed Mobility  Rolling: NT  Supine to sit: ModA  Sit to supine: NT  Scooting: ModA  SBA   Transfers Sit to stand: [address] [facility]  Stand to sit: ModA  Stand pivot: ModA without device  SBA with AAD   Ambulation   3 feet with ModA without device  >100 feet with SBA with AAD   Stair negotiation: ascended and descended NT  >4 steps with 1 rail SBA   ROM BUE:  Defer to OT note  BLE:  WNL     Strength BUE:  Defer to OT note  LLE:  4/5  RLE: 3+/5 through function  Increase by 1/3 MMT grade   Balance Sitting EOB:  Christo  Dynamic Standing:  ModA without device  Sitting EOB:  Independent  Dynamic Standing:  SBA with AAD     Pt is A & O x 3   CAM-ICU: Positive  RASS: 0  Sensation:  WNL  Edema:  None    Vitals:  Heart Rate at rest 98 bpm Heart Rate post session 109 bpm   SpO2 at rest 99% SpO2 post session 100%   Blood Pressure at rest 127/73 mmHg Blood Pressure post session 144/58 mmHg     Functional Status Score-Intensive Care Unit (FSS-ICU)   Rolling -/   Supine to sit transfer 3/7   Unsupported sitting     Sit to stand transfers 3   Ambulation    Total         Therapeutic Exercises:  NA    Patient education  Pt educated on safety    Patient response to education:   Pt verbalized understanding Pt demonstrated skill Pt requires further education in this area   partial partial yes     ASSESSMENT:    Comments:  RN reported pt was stable for session. Pt was supine in bed upon arrival, agreeable to initial evaluation with encouragement. Questionable social history provided. Pt exhibited processing deficits with functional mobility. Limited use of RUE and RLE noted during bed mobility; however, pt was able to stand from EOB without R knee buckling. Shuffled steps completed to chair with difficulty advancing RLE. Pt was left in chair with all needs met and call light in reach. All lines remained intact. Discussed with RN. Pt would benefit from an intensive rehabilitation program at discharge. Notified SHARON Bright. Treatment:  Patient practiced and was instructed in the following treatment:     Bed mobility training - pt given verbal and tactile cues to facilitate proper sequencing and safety during supine>sit as well as provided with physical assistance to complete task    Sitting EOB for >8 minutes for upright tolerance, postural awareness and BLE ROM   Transfer training - pt was given verbal and tactile cues to facilitate proper hand placement, technique and safety during sit to stand and stand to sit as well as provided with physical assistance to complete task.  Gait training- pt was given verbal and tactile cues to facilitate safety and balance during short distance ambulation as well as provided with physical assistance to complete task.  Non-pharmacological treatment and prevention of ICU delirium - Pt oriented to date, time, time of day, place, and situation as well as provided with visual and auditory stimuli in order to improve cognition and combat effects of ICU delirium. Pt's/ family goals   1. Return home    Patient and or family understand(s) diagnosis, prognosis, and plan of care. Yes    PLAN:    PT care will be provided in accordance with the objectives noted above. Exercises and functional mobility practice will be used as well as appropriate assistive devices or modalities to obtain goals. Patient and family education will also be administered as needed. Frequency of treatments: 2-5x/week x 1-2 weeks. Time in  1310  Time out  1340    Total Treatment Time  25 minutes     Evaluation Time includes thorough review of current medical information, gathering information on past medical history/social history and prior level of function, completion of standardized testing/informal observation of tasks, assessment of data and education on plan of care and goals.     CPT codes:  [] Low Complexity PT evaluation 49506  [x] Moderate Complexity PT evaluation 32337  [] High Complexity PT evaluation 08475  [] PT Re-evaluation 93106  [] Gait training 19117 - minutes  [] Manual therapy 14691 - minutes  [x] Therapeutic activities 21618 25 minutes  [] Therapeutic exercises 71855 - minutes  [] Neuromuscular reeducation 62246 - minutes     Bogdan Roberts, PT, DPT  [id number]

## 2020-07-02 NOTE — CARE COORDINATION
S/p [facility] crani for sdh. Spoke with contact listed, Radha Bray who states he is Jose's uncle. He states that Perez lives alone in an apt. He doesn't have any children and his mom has dementia. Alma Chong states that Jose's girlfriend lives in the same apt building and she would be better to discuss rehab plans. He will contact her and get back to me to discuss rehab plans.

## 2020-07-02 NOTE — PROGRESS NOTES
OCCUPATIONAL THERAPY INITIAL EVALUATION      Date:2020  Patient Name: Jaime Nuno  MRN: [id number]  : 1945  Room: [facility]    Referring Provider: JOHN Alfonso CNP    Evaluating OT: Nicole Harrington OTR/L [id number]      AM-PAC Daily Activity Raw Score:     Recommended Adaptive Equipment: TBA: AE, commode rails, tub seat       Diagnosis: L SDH    Reason for admission: found on ground; AMS and R sided weakness    Surgery:   CRANIOTOMY ELLIS HOLES FOR SUBDURAL BLEED    Pertinent Medical History: COPD, HTN, HLD    Precautions:  Falls, lumbar drain, expressive aphasia, SBP <140      Home Living: Pt lives alone  in a 1 story home with no step(s) to enter and no rail(s); bed/bath on first floor  Bathroom setup: tub/shower; standard commode  Equipment owned: no DME    Prior Level of Function: IND with ADLs;  IND with IADLs. No device for ambulation. Driving: no; pt reports family/friends assist    Pain Level: pt c/o no/10 pain  this session; c/o numbness in his \"forehead\"      Cognition: A&O: 3/4    Follows 1-2 step commands appropriately with occasional re-direction. Latent processing. Memory: impaired STM; 0/3 word recall   Comprehension fair-   Problem solving: fair-   Judgement/safety: poor insight into deficits and abilities                Communication skills: fair; occasional difficulty with word finding. Pt is soft spoken.            Vision: WFL; reports no vision changes                Glasses:no                                                   Hearing: WFL     RASS: 0   CAM-ICU: (+) Delirium    UE Assessment:  Hand Dominance: Right []  Left [x]     ROM Strength STM goal: PRN   RUE  Impaired PROM; pt resists PROM and reports he cannot use his R UE;  Shoulder: A/AROM grossly 90; elbow grossly 90 with resistance; gross grasp with impaired FMC Grossly 3-/5  WFL; 4-/5     LUE WFL Shoulder 4-/5 WFL; 4/5       Sensation: impaired sensation B UE's vs poor comprehension of testing  Tone: grossly WFL; resists ROM R UE  Edema: [facility]     Functional Assessment   Initial Eval Status  Date: 7/2 Treatment Status  Date: STG=LTG  5-14  days    Feeding NT                        S; set up  while seated up in chair to increase activity tolerance        Grooming Max A                        Min A   while seated sink level using R UE as fair functional assist during tasks   UB dressing/bathing Max A                        Min A       LB dressing/bathing Max A  Seated up in chair                        Min A   using AE as needed for safe reach/ energy conservation       Toileting NT                        Min A     Bed Mobility  Supine to sit: Mod A    Sit to supine: Mod A                        SBA  in prep of ADL tasks & transfers   Functional Transfers Sit to stand: Mod A    Stand to sit: Mod A                        SBA  sit<>stand/functional bathroom transfers using AD/DME as needed for balance and safety   Functional Mobility Mod A SPT                       SBA   functional/bathroom mobility using AD as needed & demonstrating G safety     Balance Sitting:     Static:  SBA    Dynamic:Min A  Standing: Mod A  S dynamic sitting balance; SBA dynamic standing balance  during ADL tasks & transfers   Endurance/Activity Tolerance   F tolerance with moderate activity. G   tolerance with moderate activity/self care routine   Visual/  Perceptual Impaired: pt does not visually track to cue; finger ID impaired                       Vitals:   HR at rest: 91 bpm HR at end of session: 109 bpm   Spo2 at rest:99% Spo2 at end of session 100%   BP at rest:106/75 mmHg BP at end of session 144/50 mmHg       Treatment: OT services provided include:  Instruction on precautions prior to bed mobility to facilitate safe transfers and ADLS; sitting balance retraining ex's to improve righting reactions with postural changes during ADLS; adapted techniques to increase independence and safe reach during dressing/bathing activities; functional transfer training including postural cues, hand placement/sequencing  to assist with balance and fall prevention; breathing techniques, pacing, work simplification strategies & recommended bathroom DME reviewed for safety and energy conservation during self care tasks and activities of daily living. Comments: OK from RN to see patient. Upon arrival, patient supine in bed; agreeable to session with min enocuragement. Pt demo fair- tolerance with poor understanding of education/techniques. At end of session, patient left seated up in chair. Nurse aware. Call light within reach, all lines and tubes intact. Pt instructed on use of call light for assistance and fall prevention. Line management and environmental modifications made prior to and end of session to ensure patient safety and to increase efficiency of session. Skilled monitoring of HR, O2 saturation, blood pressure and patient's response to activity performed throughout session. Overall, pt presents with decreased ROM/processing, activity tolerance, dynamic balance, functional mobility and impaired cognition limiting completion of ADLs and safety. Pt can benefit from continued skilled OT to increase safety and functional independence. Evaluation Complexity: Moderate    · History: Expanded chart review of consults, imaging, and psychosocial history related to current functional performance. · Exam: 5+ performance deficits identified limiting functional independence and safe return home   · Assistance/Modification: Min/mod assistance or modifications required to perform tasks. May have comorbidities that affect occupational performance.     Assessment of current deficits   Functional mobility [x]  ADLs [x] Strength [x]  Cognition [x]  Functional transfers  [x] IADLs [x] Safety Awareness [x]  Endurance [x]  Fine Motor Coordination [x] Balance [x] Vision/perception [x] Sensation []   Gross Motor Coordination [x] ROM [x] Delirium [x] Communication [x]    Plan of Care: 1-5 tx/wk PRN   ADL retraining [x]   Equipment needs [x]   Neuromuscular re-education [] Energy Conservation Techniques [x]  Functional Transfer training [x] Patient and/or Family Education [x]  Functional Mobility training [x]  Environmental Modifications [x]  Cognitive re-training []   Compensatory techniques for ADLs [x]  Splinting Needs []   Positioning to improve overall function [x]   Therapeutic Activity [x]                       Therapeutic Exercise  [x]  Visual/Perceptual: []    Delirium prevention/treatment  [x]   Other:  []    Rehab Potential: good for established goals    Patient / Family Goal: pt does not state    Patient and/or family were instructed/educated on diagnosis, prognosis/goals and plan of care. Pt demonstrated P understanding. [] Malnutrition indicators have been identified and nursing has been notified to ensure a dietitian consult is ordered. Moderate Evaluation +   Treatment Time In:1315             Treatment Time Out: 1340                 Treatment Charges: Mins Units   Ther Ex  45502     Manual Therapy 49243     Thera Activities 54683 25 2   ADL/Home Mgt 50814     Neuro Re-ed 46472     Orthotic manage/training  44305     Non-Billable Time     Total Timed Treatment 25 2       Evaluation time includes thorough review of current medical information, gathering information on past medical history/social history and prior level of function, completion of standardized testing/informal observation of tasks, assessment of data and development of POC/Goals.      Fernanda Randall, OTR/L [id number]

## 2020-07-02 NOTE — PROGRESS NOTES
[facility]  PROGRESS NOTE  ATTENDING NOTE    CRITICAL CARE    Chief Complaint   Patient presents with    Consultation     [facility] is [facility]. Pt was found on the ground with LKW 4 days ago. Pt arrived with [facility] with right nfacial droop/weakness, and aphasia. Pt has subdural hematoma       HPI  Mr. Josué Madsen, a 70y.o. year old male with a past medical history of COPD, hypertension, hyperlipidemia who was transferred from outside facility to [facility] emergency department for abnormal CT finding. Patient was last seen by his neighbors 4 days ago. They with check on him he did not answer the door. EMS was called. He was found on the floor. Noted to have expressive aphasia and right-sided facial droop. Also right upper and lower extremity weakness. Was able to answer yes and no questions but able to give more than 1 word answers. CT was performed at the [facility] which showed a large left subdural bleed.     On arrival to this facility patient's vital signs within normal limits and stable although a bit hypertensive with pressure 157/84. Afebrile. On exam noted to have expressive aphasia. Oriented to person only. Weak on right side. Able to move the left upper and lower extremity without difficulty. Laboratory studies unremarkable. Trauma team did evaluate the patient but did not find anything indicative of traumatic injury. Neurosurgery was consulted patient was taken emergently to the OR for craniotomy/tameka holes.     Patient Active Problem List   Diagnosis    Subdural hematoma ([facility] [address]       OVERNIGHT EVENTS:  Admitted to SICU, tameka hole [id number]:  7/2:  Transferred from [facility]; admitted, tameka hole crani; extubated tolerating diet    BP (!) 145/68   Pulse 109   Temp 97.8 °F (36.6 °C) (Oral)   Resp 15   Ht 6' 2\" (1.88 m)   Wt 160 lb (72.6 kg)   SpO2 100%   BMI 20.54 kg/m²   Physical Exam  Constitutional:       Appearance: Normal appearance. HENT:      Head: Normocephalic. Comments: Drain in place     Nose: Nose normal.      Mouth/Throat:      Mouth: Mucous membranes are moist.      Pharynx: Oropharynx is clear. Eyes:      Extraocular Movements: Extraocular movements intact. Pupils: Pupils are equal, round, and reactive to light. Neck:      Musculoskeletal: Normal range of motion and neck supple. Cardiovascular:      Rate and Rhythm: Normal rate and regular rhythm. Pulses: Normal pulses. Heart sounds: Normal heart sounds. Pulmonary:      Effort: Pulmonary effort is normal.      Breath sounds: Normal breath sounds. Abdominal:      General: There is no distension. Palpations: Abdomen is soft. Tenderness: There is no abdominal tenderness. Musculoskeletal:      Right lower leg: No edema. Left lower leg: No edema. Skin:     General: Skin is warm and dry. Neurological:      Mental Status: He is alert. Comments: A/O x 3, right sided weakness   Psychiatric:         Mood and Affect: Mood normal.         Behavior: Behavior normal.         Thought Content: Thought content normal.         Judgment: Judgment normal.         Lines: Fuentes:  yes - Continue fuentes catheter for managing strict I and Os in this critically ill patient. Central line:  no  PICC:  no    CAM-ICU:  negative  RASS:  RASS 0 (Alert and Calm)    ASSESSMENT/PLAN:  1.  SDH/TBI  --s/p tameka hole crani  --Keppra x 30 days  --NSGY following  --maintain Na normal  2. Chronic hyponatremia  --recheck Na  --hold HCTZ  3. HTN  --PRN antihypertensives  4. Alcohol abuse--daily rhea  --thiamine, folate  --SW for SBI  --monitor for Withdrawal  --uncertain of the history and is 5 days out from last drink, will monitor for now  5.   Moderate malnutrition  --start diet    DVT/GI ppx--bilateral SCDs, diet, protonix    CC TIME:  I spent 33 min managing this patients critical issues which are a constant threat to life excluding time teaching and performing procedures.       Rosi Laguna MD, MSc, FACS  7/2/2020  3:24 PM

## 2020-07-02 NOTE — ANESTHESIA PRE PROCEDURE
07/02/2020    CO2 24 07/02/2020    BUN 19 07/02/2020    CREATININE 1.2 07/02/2020    GFRAA >60 07/02/2020    LABGLOM >60 07/02/2020    GLUCOSE 104 07/02/2020    PROT 6.8 07/02/2020    CALCIUM 9.3 07/02/2020    BILITOT 0.3 07/02/2020    ALKPHOS 61 07/02/2020    AST 14 07/02/2020    ALT 11 07/02/2020       POC Tests: No results for input(s): POCGLU, POCNA, POCK, POCCL, POCBUN, POCHEMO, POCHCT in the last 72 hours. Coags:   Lab Results   Component Value Date    PROTIME 12.7 07/02/2020    INR 1.1 07/02/2020       HCG (If Applicable): No results found for: PREGTESTUR, PREGSERUM, HCG, HCGQUANT     ABGs: No results found for: PHART, PO2ART, SGV9BKB, PCQ1IQS, BEART, J8TUIPUP     Type & Screen (If Applicable):  No results found for: LABABO, LABRH    Drug/Infectious Status (If Applicable):  No results found for: HIV, HEPCAB    COVID-19 Screening (If Applicable): No results found for: COVID19      Anesthesia Evaluation  Patient summary reviewed and Nursing notes reviewed no history of anesthetic complications:   Airway: Mallampati: III  TM distance: >3 FB   Neck ROM: full  Mouth opening: < 3 FB Dental:    (+) edentulous      Pulmonary:Negative Pulmonary ROS                              Cardiovascular:Negative CV ROS            Rhythm: regular  Rate: normal                    Neuro/Psych:                ROS comment: A+Ox2 GI/Hepatic/Renal: Neg GI/Hepatic/Renal ROS            Endo/Other: Negative Endo/Other ROS                    Abdominal:           Vascular: negative vascular ROS. Anesthesia Plan      general     ASA 2 - emergent     (Pt is 6'2\" 160 lb NKA)  Induction: intravenous. MIPS: Postoperative opioids intended and Prophylactic antiemetics administered. Anesthetic plan and risks discussed with patient. Use of blood products discussed with patient whom consented to blood products. Plan discussed with CRNA and attending.                   Erica Worthington RN 7/2/2020

## 2020-07-03 ENCOUNTER — APPOINTMENT (OUTPATIENT)
Dept: CT IMAGING | Age: 75
DRG: 026 | End: 2020-07-03
Payer: MEDICARE

## 2020-07-03 LAB
ANION GAP SERPL CALCULATED.3IONS-SCNC: 13 MMOL/L (ref 7–16)
BUN BLDV-MCNC: 18 MG/DL (ref 8–23)
CALCIUM IONIZED: 1.38 MMOL/L (ref 1.15–1.33)
CALCIUM SERPL-MCNC: 9.6 MG/DL (ref 8.6–10.2)
CHLORIDE BLD-SCNC: 101 MMOL/L (ref 98–107)
CO2: 24 MMOL/L (ref 22–29)
CREAT SERPL-MCNC: 1.2 MG/DL (ref 0.7–1.2)
GFR AFRICAN AMERICAN: >60
GFR NON-AFRICAN AMERICAN: >60 ML/MIN/1.73
GLUCOSE BLD-MCNC: 108 MG/DL (ref 74–99)
HCT VFR BLD CALC: 39.8 % (ref 37–54)
HEMOGLOBIN: 12.8 G/DL (ref 12.5–16.5)
MAGNESIUM: 1.9 MG/DL (ref 1.6–2.6)
MCH RBC QN AUTO: 29.2 PG (ref 26–35)
MCHC RBC AUTO-ENTMCNC: 32.2 % (ref 32–34.5)
MCV RBC AUTO: 90.9 FL (ref 80–99.9)
MRSA CULTURE ONLY: NORMAL
PDW BLD-RTO: 14 FL (ref 11.5–15)
PHOSPHORUS: 3.2 MG/DL (ref 2.5–4.5)
PLATELET # BLD: 199 E9/L (ref 130–450)
PMV BLD AUTO: 10.3 FL (ref 7–12)
POTASSIUM SERPL-SCNC: 4.2 MMOL/L (ref 3.5–5)
RBC # BLD: 4.38 E12/L (ref 3.8–5.8)
SODIUM BLD-SCNC: 138 MMOL/L (ref 132–146)
WBC # BLD: 12.2 E9/L (ref 4.5–11.5)

## 2020-07-03 PROCEDURE — 83735 ASSAY OF MAGNESIUM: CPT

## 2020-07-03 PROCEDURE — 92523 SPEECH SOUND LANG COMPREHEN: CPT

## 2020-07-03 PROCEDURE — 36415 COLL VENOUS BLD VENIPUNCTURE: CPT

## 2020-07-03 PROCEDURE — 99291 CRITICAL CARE FIRST HOUR: CPT | Performed by: SURGERY

## 2020-07-03 PROCEDURE — 6370000000 HC RX 637 (ALT 250 FOR IP): Performed by: NURSE PRACTITIONER

## 2020-07-03 PROCEDURE — 85027 COMPLETE CBC AUTOMATED: CPT

## 2020-07-03 PROCEDURE — 84100 ASSAY OF PHOSPHORUS: CPT

## 2020-07-03 PROCEDURE — 6370000000 HC RX 637 (ALT 250 FOR IP): Performed by: SURGERY

## 2020-07-03 PROCEDURE — 82330 ASSAY OF CALCIUM: CPT

## 2020-07-03 PROCEDURE — 2000000000 HC ICU R&B

## 2020-07-03 PROCEDURE — 51701 INSERT BLADDER CATHETER: CPT

## 2020-07-03 PROCEDURE — 51798 US URINE CAPACITY MEASURE: CPT

## 2020-07-03 PROCEDURE — 6370000000 HC RX 637 (ALT 250 FOR IP): Performed by: STUDENT IN AN ORGANIZED HEALTH CARE EDUCATION/TRAINING PROGRAM

## 2020-07-03 PROCEDURE — 80048 BASIC METABOLIC PNL TOTAL CA: CPT

## 2020-07-03 PROCEDURE — 2580000003 HC RX 258: Performed by: FAMILY MEDICINE

## 2020-07-03 PROCEDURE — 70450 CT HEAD/BRAIN W/O DYE: CPT

## 2020-07-03 PROCEDURE — 6360000002 HC RX W HCPCS: Performed by: NURSE PRACTITIONER

## 2020-07-03 RX ADMIN — DOCUSATE SODIUM 100 MG: 100 CAPSULE, LIQUID FILLED ORAL at 07:58

## 2020-07-03 RX ADMIN — PANTOPRAZOLE SODIUM 40 MG: 40 TABLET, DELAYED RELEASE ORAL at 05:58

## 2020-07-03 RX ADMIN — HYDRALAZINE HYDROCHLORIDE 10 MG: 20 INJECTION INTRAMUSCULAR; INTRAVENOUS at 02:29

## 2020-07-03 RX ADMIN — HYDRALAZINE HYDROCHLORIDE 10 MG: 20 INJECTION INTRAMUSCULAR; INTRAVENOUS at 22:07

## 2020-07-03 RX ADMIN — SODIUM CHLORIDE, PRESERVATIVE FREE 10 ML: 5 INJECTION INTRAVENOUS at 07:57

## 2020-07-03 RX ADMIN — FOLIC ACID 1 MG: 1 TABLET ORAL at 07:58

## 2020-07-03 RX ADMIN — LEVETIRACETAM 500 MG: 500 TABLET, FILM COATED ORAL at 21:12

## 2020-07-03 RX ADMIN — Medication 100 MG: at 07:58

## 2020-07-03 RX ADMIN — SODIUM CHLORIDE, PRESERVATIVE FREE 10 ML: 5 INJECTION INTRAVENOUS at 21:11

## 2020-07-03 RX ADMIN — LEVETIRACETAM 500 MG: 500 TABLET, FILM COATED ORAL at 07:57

## 2020-07-03 ASSESSMENT — PAIN SCALES - GENERAL
PAINLEVEL_OUTOF10: 0

## 2020-07-03 NOTE — PROGRESS NOTES
Trauma Tertiary Survey    Admit Date: 7/2/2020  Hospital day 1    CC:  GLF       Past Medical History:   Diagnosis Date    BPH (benign prostatic hyperplasia)     COPD (chronic obstructive pulmonary disease) (HCC)     HLD (hyperlipidemia)     HTN (hypertension)        Alcohol pre-screening:  Men: How many times in the past year have you had 5 or more drinks in a day?  1 or more    Scheduled Meds:   sodium chloride flush  10 mL Intravenous 2 times per day    thiamine  100 mg Oral Daily    folic acid  1 mg Oral Daily    [Held by provider] lisinopril  20 mg Oral Daily    docusate sodium  100 mg Oral Daily    pantoprazole  40 mg Oral QAM AC    levETIRAcetam  500 mg Oral BID     Continuous Infusions:   dextrose       PRN Meds:sodium chloride flush, [DISCONTINUED] promethazine **OR** ondansetron, glucose, dextrose, glucagon (rDNA), dextrose, hydrALAZINE, labetalol, ipratropium-albuterol, acetaminophen    Subjective:     Pain Controlled, denies any nausea vomiting, GCS 15. Objective:     Patient Vitals for the past 8 hrs:   BP Temp Temp src Pulse Resp SpO2   07/03/20 1700 (!) 150/78 -- -- 63 17 97 %   07/03/20 1600 (!) 149/79 98.1 °F (36.7 °C) Temporal 55 16 100 %   07/03/20 1500 125/82 -- -- 61 18 99 %   07/03/20 1400 (!) 142/85 98.1 °F (36.7 °C) Temporal 58 15 100 %   07/03/20 1300 (!) 144/75 -- -- 56 15 100 %   07/03/20 1200 135/77 -- -- 61 15 100 %   07/03/20 1100 127/71 -- -- 61 17 100 %   07/03/20 1000 137/73 97.8 °F (36.6 °C) Temporal 77 18 100 %       I/O last 3 completed shifts: In: 1295 [P.O.:770; I.V.:525]  Out: 3199 [Urine:1625; Drains:50]  No intake/output data recorded. Past Medical History:   Diagnosis Date    BPH (benign prostatic hyperplasia)     COPD (chronic obstructive pulmonary disease) (HCC)     HLD (hyperlipidemia)     HTN (hypertension)        Radiology:  CT Head WO Contrast   Final Result   1.  Placement of a left frontal percutaneous subdural catheter for   drainage of the left subdural hematoma. 2. Significant drainage of the left subdural hematoma. 3. There is a prominence of the peripheral CSF spaces over the   cerebral convexities to the previous brain parenchyma compression. 4. Significant decrease in midline shifted to the right. 5. Continued follow-up study recommended. CT Cervical Spine WO Contrast   Final Result   No acute fracture or dislocation. 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 Most severe is C4-5. Multilevel intervertebral foraminal stenoses from spurring. CT Head WO Contrast   Final Result   ALERT:  THIS IS AN ABNORMAL REPORT      Evidence of some interval progression of subdural hemorrhage on the   left greater quantity of dense rebleed compared to the prior study. Left-to-right midline shift appears modestly increased. XR PELVIS (1-2 VIEWS)   Final Result   No significant abnormal findings. Positioning variance from ideal.      XR CHEST PORTABLE   Final Result   Likely biapical scarring. Nothing else active. PHYSICAL EXAM:   GCS:  4 - Opens eyes on own   6 - Follows simple motor commands  5 - Alert and oriented    Pupil size:  Left 3 mm Right 3 mm  Pupil reaction: Yes  Wiggles fingers: Left Yes Right Yes  Hand grasp:   Left normal   Right normal  Wiggles toes: Left Yes    Right Yes  Plantar flexion: Left normal  Right normal    PHYSICAL EXAM  General: No apparent distress, comfortable   HEENT: Trachea midline, no masses, Pupils equal round, drain in place  Chest: Respiratory effort was normal with no retractions or use of accessory muscles. Cardiovascular: Extremities warm, well perfused,   Abdomen:  Soft and non distended.   No tenderness, guarding, rebound, or rigidity   Extremities: Moves all 4 extremeties, No pedal edema     Spine:     Spine Tenderness ROM   Cervical 0 /10 Normal   Thoracic 0 /10 Normal   Lumbar 0 /10 Normal     Musculoskeletal    Joint Tenderness Swelling ROM   Right shoulder absent absent normal   Left shoulder absent absent normal   Right elbow absent absent normal   Left elbow absent absent normal   Right wrist absent absent normal   Left wrist absent absent normal   Right hand grasp absent absent normal   Left hand grasp absent absent normal   Right hip absent absent normal   Left hip absent absent normal   Right knee absent absent normal   Left knee absent absent normal   Right ankle absent absent normal   Left ankle absent absent normal   Right foot absent absent normal   Left foot absent absent normal       CONSULTS: Neurosurgery, IM. PROCEDURES: [facility], 7/2    INJURIES:        Active Problems:    Subdural hematoma ([address]  Resolved Problems:    * No resolved hospital problems. *        Assessment/Plan:       · Neuro: Acute on chronic SDH with 9 mm shift, status post bur hole craniotomy, repeat head CT with 3 mm shift, GCS 15, Keppra, neurosurgery, Struve EtOH abuse, thiamine and folate  · CV: HR near normal limits, resume home medication  · Pulm: tolerating room air, deep breathing/IS  · GI: tolerating general diet  · Renal: no acute issues, remove Villagran, and her urine output/creatinine  · ID: afebrile, Ancef per neurosurgery  · Endocrine: no acute issues, monitor glucose   · MSK: no acute issues, PT/OT when able    · Heme: no acute issues     Bowel regime: colace, MOM  Pain control/Sedation: Tylenol as needed  DVT prophylaxis: SCDs, hold chemoprophylaxis until cleared by neurosurgery  GI: diet   Glucose protocol: None  Mouth/Eye care: per patient.    Villagran: none    Code status:    Full Code    Patient/Family update:  As available    Disposition:  Continue SICU      Electronically signed by Valentino Labs, MD on 7/3/20 at 5:41 PM EDT

## 2020-07-03 NOTE — PROGRESS NOTES
PO#1  [facility] for Subdural left. Doing much better  Alert awake fairly oriented. Moving right side almost normally. Andra Vásquez working well.   Reviewed CT scan of brain

## 2020-07-03 NOTE — PROGRESS NOTES
Hospitalist Progress Note      SYNOPSIS: Patient admitted on 2020 after CT revealed a large left subdural bleed. Patient was found initially on the floor with expressive aphasia and right-sided facial droop as well as right upper and lower extremity weakness. SUBJECTIVE:    Patient seen and examined  Records reviewed. Feeling well. Denies pain or discomfort    Stable overnight. No other overnight issues reported. Temp (24hrs), Av.7 °F (36.5 °C), Min:97 °F (36.1 °C), Max:98.6 °F (37 °C)    DIET: DIET DENTAL SOFT;  CODE: Full Code    Intake/Output Summary (Last 24 hours) at 7/3/2020 0945  Last data filed at 7/3/2020 0600  Gross per 24 hour   Intake 1155 ml   Output 2085 ml   Net -930 ml       OBJECTIVE:    BP (!) 145/64   Pulse 68   Temp 97.4 °F (36.3 °C) (Temporal)   Resp 14   Ht 6' 2\" (1.88 m)   Wt 149 lb 7.6 oz (67.8 kg)   SpO2 100%   BMI 19.19 kg/m²     General appearance: No acute distress, breathing without difficulty  HEENT: Normocephalic, atraumatic. EOMI  Neck: Supple. Trachea midline  Respiratory: Clear to auscultation bilaterally  Cardiovascular: Regular rate rhythm, normal S1-S2  Abdomen: Soft, nontender, nondistended  Musculoskeletal: Normal range of motion, no deformity  Skin:  No rashes  on visible skin  Neurologic: awake, alert and following commands, cranial nerves II to XII grossly intact    ASSESSMENT:  1.  Subdural hemorrhage that is post bur hole/craniotomy  2. Hypertension  3. Alcohol dependence  4. Moderate malnutrition       PLAN:  1. Critical care consulted  2. Neurosurgery consulted  3. Keppra 500 mg twice daily  4. Folic acid and thiamine daily  5. Labetalol 10 mg IV as needed systolic blood pressure greater than 140  6.   Consult PM&R      Medications:  REVIEWED DAILY    Infusion Medications    dextrose       Scheduled Medications    sodium chloride flush  10 mL Intravenous 2 times per day    thiamine  100 mg Oral Daily    folic acid  1 mg Oral

## 2020-07-03 NOTE — PROGRESS NOTES
SPEECH/LANGUAGE PATHOLOGY  SPEECH/LANGUAGE/COGNITIVE EVALUATION      PATIENT NAME:  Patrick Ascencio      :  1945          TODAY'S DATE:  7/3/2020 ROOM:  3802/[facility]-A       ADMITTING DIAGNOSIS: Subdural hematoma ([facility] [address] [S06.5X9A]    SPEECH PATHOLOGY DIAGNOSIS:    Mild cognitive deficits, reduced respiration for speech production      THERAPY RECOMMENDATIONS:   Speech Pathology intervention is recommended 3-6 times per week for LOS or when goals are met with emphasis on the following: To improve all areas of memory for functional activities  To improve breath support for speech production               MOTOR SPEECH       Oral Peripheral Examination   Generalized oral weakness    Parameters of Speech Production  Respiration:  Inadequate for speech production  Articulation:  Within functional limits  Resonance:  Within functional limits  Quality:   Breathy  Pitch:    Low  Intensity: Quiet  Fluency:  Intact  Prosody Intact    RECEPTIVE LANGUAGE    Comprehension of Yes/No Questions:    Within functional limits    Process  Simple Verbal Commands:   Within functional limits  Process Intermediate Verbal Commands:   Within functional limits  Process Complex Verbal Commands:     Within functional limits    Comprehension of Conversation:      Within functional limits      EXPRESSIVE LANGUAGE     Serials: Functional    Imitation:  Words   Functional   Sentences Functional    Naming:  (Modality used:  Verbal)  Confrontation Naming  Functional  Functional Description  Functional  Response Naming: Functional    Conversation:      Confusion was noted during conversation    COGNITION     Attention/Orientation  Attention: Sustained attention   Orientation:  Oriented to Person, Place, Date, Reason for hospitalization    Memory   Immediate Recall: Repeated 3/3    Delayed Recall:   Recalled 1/3     Long Term Recall:   Recalled Address, Birthdate, Age and Family      CLINICAL OBSERVATIONS NOTED DURING THE EVALUATION  Cueing was required                  Prognosis for improvements is good  This plan will be re-evaluated and revised in 1 week  if warranted. Patient stated goals: Agreed with above  Treatment goals discussed with Patient   The Patient understand(s) the diagnosis, prognosis and plan of care       CPT code:    33758  eval speech sound lang comprehension        The admitting diagnosis and active problem list, as listed below have been reviewed prior to initiation of this evaluation.         ACTIVE PROBLEM LIST:   Patient Active Problem List   Diagnosis    Subdural hematoma ([address]

## 2020-07-03 NOTE — PROGRESS NOTES
[facility]  PROGRESS NOTE  ATTENDING NOTE    CRITICAL CARE    Chief Complaint   Patient presents with    Consultation     Pt is transfer from [place]. Pt was found on the ground with LKW 4 days ago. Pt arrived with [place] with right nfacial droop/weakness, and aphasia. Pt has subdural hematoma       HPI  Mr. Hung Rodriguez, a 70y.o. year old male with a past medical history of COPD, hypertension, hyperlipidemia who was transferred from outside facility to [facility] emergency department for abnormal CT finding. Patient was last seen by his neighbors 4 days ago. They with check on him he did not answer the door. EMS was called. He was found on the floor. Noted to have expressive aphasia and right-sided facial droop. Also right upper and lower extremity weakness. Was able to answer yes and no questions but able to give more than 1 word answers. CT was performed at the [facility] which showed a large left subdural bleed.     On arrival to this facility patient's vital signs within normal limits and stable although a bit hypertensive with pressure 157/84. Afebrile. On exam noted to have expressive aphasia. Oriented to person only. Weak on right side. Able to move the left upper and lower extremity without difficulty. Laboratory studies unremarkable. Trauma team did evaluate the patient but did not find anything indicative of traumatic injury. Neurosurgery was consulted patient was taken emergently to the OR for craniotomy/tameka holes.     Patient Active Problem List   Diagnosis    Subdural hematoma ([address]       OVERNIGHT EVENTS:  No issues overnight    HOSPITAL COURSE:  7/2:  Transferred from [place]; admitted, tameka hole crani; extubated tolerating diet  7/3:  Continued crani drain    /71   Pulse 61   Temp 97.8 °F (36.6 °C) (Temporal)   Resp 17   Ht 6' 2\" (1.88 m)   Wt 149 lb 7.6 oz (67.8 kg)   SpO2 100%   BMI 19.19 kg/m²   Physical Exam  Constitutional: Appearance: Normal appearance. HENT:      Head: Normocephalic. Comments: Drain in place     Nose: Nose normal.      Mouth/Throat:      Mouth: Mucous membranes are moist.      Pharynx: Oropharynx is clear. Eyes:      Extraocular Movements: Extraocular movements intact. Pupils: Pupils are equal, round, and reactive to light. Neck:      Musculoskeletal: Normal range of motion and neck supple. Cardiovascular:      Rate and Rhythm: Normal rate and regular rhythm. Pulses: Normal pulses. Heart sounds: Normal heart sounds. Pulmonary:      Effort: Pulmonary effort is normal.      Breath sounds: Normal breath sounds. Abdominal:      General: There is no distension. Palpations: Abdomen is soft. Tenderness: There is no abdominal tenderness. Musculoskeletal:      Right lower leg: No edema. Left lower leg: No edema. Skin:     General: Skin is warm and dry. Neurological:      Mental Status: He is alert. Comments: A/O x 3, right sided weakness--resolved/ 5/5 strength bilateral upper and lower extremities   Psychiatric:         Mood and Affect: Mood normal.         Behavior: Behavior normal.         Thought Content: Thought content normal.         Judgment: Judgment normal.         Lines: Villagran:  remove   Central line:  no  PICC:  no    CAM-ICU:  negative  RASS:  RASS 0 (Alert and Calm)    ASSESSMENT/PLAN:  1.  SDH/TBI  --s/p tameka hole crani  --Keppra x 30 days  --NSGY following  --maintain Na normal  2. Chronic hyponatremia--improved  --recheck Na--improved  --hold HCTZ  3. HTN  --PRN antihypertensives  4. Alcohol abuse--daily rhea  --thiamine, folate  --SW for SBI  --monitor for Withdrawal  --uncertain of the history and is 5 days out from last drink, will monitor for now  5.   Moderate malnutrition  --diet    DVT/GI ppx--bilateral SCDs, diet, protonix    CC TIME:  I spent 31 min managing this patients critical issues which are a constant threat to life excluding time teaching and performing procedures.       Shikha Jennings MD, MSc, FACS  7/3/2020  12:12 PM

## 2020-07-03 NOTE — CONSULTS
[address] [facility]                  [address] [facility],  [address]                                  CONSULTATION    PATIENT NAME: Akanksha Gonzales                    :        1945  MED REC NO:   [id number]                            ROOM:       8178  ACCOUNT NO:   [de-identified]                           ADMIT DATE: 2020  PROVIDER:     Tim Rausch MD    CONSULT DATE:  2020    REHAB CONSULT    CHIEF COMPLAINT:  Intracranial bleed. HISTORY OF PRESENT ILLNESS:  The patient was found at home unresponsive. He was brought to [facility] on 2020 and found to have a  large left subdural hematoma with mass effect. He had an urgent tameka  hole craniotomy done by Dr. Jose Melendez. He tolerated the procedure well. He  still has a drain in place, but he is awake and alert, talking and  moving. He has been evaluated by Therapy. He is still at a max assist  level with his functioning and still has the drain in place, but again  is interacting and following commands pretty well at this point. PAST MEDICAL HISTORY:  1. COPD. 2.  Hypertension. 3.  Hyperlipidemia. 4.  Benign prostatic hypertrophy. ALLERGIES:  None known. CURRENT MEDICATIONS:  Colace, folic acid, Keppra, Protonix, and  thiamine. SOCIAL HISTORY:  The patient lives alone. REVIEW OF SYSTEMS:  HEENT:  Negative for prior HEENT problems. CARDIAC:  Negative for prior cardiac problems. PULMONARY:  Pulmonary is positive  for COPD. GI AND :  Negative. ORTHOPEDIC:  Negative. NEUROLOGIC:   Positive for his current deficits. PHYSICAL EXAMINATION:  GENERAL:  A well-nourished, well-developed [facility] male, in no  acute distress. HEENT:  Head:  There is a left side craniotomy with a drain. Eyes:   Pupils equal, round, reactive to light. LUNGS:  Scattered rales. HEART:  Regular rate and rhythm. S1, S2 normal.  No murmurs or gallops.   ABDOMEN:  Bowel sounds normal. Soft, nontender. No masses. EXTREMITIES:  Without clubbing, cyanosis or edema. MENTAL STATUS:  The patient is alert. He is oriented to person and  place, partially to time. Sensation is grossly intact. NEUROMUSCULAR:  4/5 strength throughout. PROBLEM LIST:  1. Left subdural hematoma. 2.  Status post craniotomy. 3.  Hypertension. 4.  COPD. 5.  Hyperlipidemia. RECOMMENDATIONS:  I think the patient is probably going to be a good  candidate for acute rehab. He seems to be recovering well so far, but  still has the drain in place. He is still in ICU as well. Once the  drain is out and he is stable, we will have Therapy reevaluate and then  address pre-cert issues for acute rehab.         Whitley Brooks MD    D: 07/03/2020 10:42:20       T: 07/03/2020 10:52:02     [email or web address]  Job#: [id number]     Doc#: [id number]    CC:

## 2020-07-04 LAB
ANION GAP SERPL CALCULATED.3IONS-SCNC: 14 MMOL/L (ref 7–16)
BUN BLDV-MCNC: 19 MG/DL (ref 8–23)
CALCIUM IONIZED: 1.35 MMOL/L (ref 1.15–1.33)
CALCIUM SERPL-MCNC: 9.6 MG/DL (ref 8.6–10.2)
CHLORIDE BLD-SCNC: 101 MMOL/L (ref 98–107)
CO2: 22 MMOL/L (ref 22–29)
CREAT SERPL-MCNC: 1 MG/DL (ref 0.7–1.2)
GFR AFRICAN AMERICAN: >60
GFR NON-AFRICAN AMERICAN: >60 ML/MIN/1.73
GLUCOSE BLD-MCNC: 90 MG/DL (ref 74–99)
HCT VFR BLD CALC: 38.8 % (ref 37–54)
HEMOGLOBIN: 12.9 G/DL (ref 12.5–16.5)
MAGNESIUM: 1.7 MG/DL (ref 1.6–2.6)
MCH RBC QN AUTO: 29.7 PG (ref 26–35)
MCHC RBC AUTO-ENTMCNC: 33.2 % (ref 32–34.5)
MCV RBC AUTO: 89.2 FL (ref 80–99.9)
METER GLUCOSE: 90 MG/DL (ref 74–99)
METER GLUCOSE: 91 MG/DL (ref 74–99)
PDW BLD-RTO: 14.1 FL (ref 11.5–15)
PHOSPHORUS: 3.1 MG/DL (ref 2.5–4.5)
PLATELET # BLD: 211 E9/L (ref 130–450)
PMV BLD AUTO: 9.9 FL (ref 7–12)
POTASSIUM SERPL-SCNC: 3.7 MMOL/L (ref 3.5–5)
RBC # BLD: 4.35 E12/L (ref 3.8–5.8)
SODIUM BLD-SCNC: 137 MMOL/L (ref 132–146)
WBC # BLD: 7.7 E9/L (ref 4.5–11.5)

## 2020-07-04 PROCEDURE — 51701 INSERT BLADDER CATHETER: CPT

## 2020-07-04 PROCEDURE — 6360000002 HC RX W HCPCS: Performed by: NURSE PRACTITIONER

## 2020-07-04 PROCEDURE — 6370000000 HC RX 637 (ALT 250 FOR IP): Performed by: STUDENT IN AN ORGANIZED HEALTH CARE EDUCATION/TRAINING PROGRAM

## 2020-07-04 PROCEDURE — 84100 ASSAY OF PHOSPHORUS: CPT

## 2020-07-04 PROCEDURE — 6370000000 HC RX 637 (ALT 250 FOR IP): Performed by: NURSE PRACTITIONER

## 2020-07-04 PROCEDURE — 82330 ASSAY OF CALCIUM: CPT

## 2020-07-04 PROCEDURE — 80048 BASIC METABOLIC PNL TOTAL CA: CPT

## 2020-07-04 PROCEDURE — 36415 COLL VENOUS BLD VENIPUNCTURE: CPT

## 2020-07-04 PROCEDURE — 2500000003 HC RX 250 WO HCPCS: Performed by: NURSE PRACTITIONER

## 2020-07-04 PROCEDURE — 2580000003 HC RX 258: Performed by: FAMILY MEDICINE

## 2020-07-04 PROCEDURE — 82962 GLUCOSE BLOOD TEST: CPT

## 2020-07-04 PROCEDURE — 83735 ASSAY OF MAGNESIUM: CPT

## 2020-07-04 PROCEDURE — 99233 SBSQ HOSP IP/OBS HIGH 50: CPT | Performed by: SURGERY

## 2020-07-04 PROCEDURE — 2060000000 HC ICU INTERMEDIATE R&B

## 2020-07-04 PROCEDURE — 85027 COMPLETE CBC AUTOMATED: CPT

## 2020-07-04 PROCEDURE — 6370000000 HC RX 637 (ALT 250 FOR IP): Performed by: SURGERY

## 2020-07-04 PROCEDURE — 51798 US URINE CAPACITY MEASURE: CPT

## 2020-07-04 RX ORDER — POLYETHYLENE GLYCOL 3350 17 G/17G
17 POWDER, FOR SOLUTION ORAL DAILY
Status: DISCONTINUED | OUTPATIENT
Start: 2020-07-04 | End: 2020-07-07 | Stop reason: HOSPADM

## 2020-07-04 RX ORDER — TAMSULOSIN HYDROCHLORIDE 0.4 MG/1
0.4 CAPSULE ORAL DAILY
Status: DISCONTINUED | OUTPATIENT
Start: 2020-07-04 | End: 2020-07-07 | Stop reason: HOSPADM

## 2020-07-04 RX ORDER — SENNA PLUS 8.6 MG/1
1 TABLET ORAL NIGHTLY
Status: DISCONTINUED | OUTPATIENT
Start: 2020-07-04 | End: 2020-07-07 | Stop reason: HOSPADM

## 2020-07-04 RX ADMIN — LEVETIRACETAM 500 MG: 500 TABLET, FILM COATED ORAL at 20:08

## 2020-07-04 RX ADMIN — SODIUM CHLORIDE, PRESERVATIVE FREE 10 ML: 5 INJECTION INTRAVENOUS at 05:00

## 2020-07-04 RX ADMIN — LABETALOL HYDROCHLORIDE 10 MG: 5 INJECTION INTRAVENOUS at 20:08

## 2020-07-04 RX ADMIN — Medication 100 MG: at 08:14

## 2020-07-04 RX ADMIN — FOLIC ACID 1 MG: 1 TABLET ORAL at 08:13

## 2020-07-04 RX ADMIN — PANTOPRAZOLE SODIUM 40 MG: 40 TABLET, DELAYED RELEASE ORAL at 07:06

## 2020-07-04 RX ADMIN — POLYETHYLENE GLYCOL 3350 17 G: 17 POWDER, FOR SOLUTION ORAL at 08:14

## 2020-07-04 RX ADMIN — LEVETIRACETAM 500 MG: 500 TABLET, FILM COATED ORAL at 08:13

## 2020-07-04 RX ADMIN — SODIUM CHLORIDE, PRESERVATIVE FREE 10 ML: 5 INJECTION INTRAVENOUS at 08:14

## 2020-07-04 RX ADMIN — SODIUM CHLORIDE, PRESERVATIVE FREE 10 ML: 5 INJECTION INTRAVENOUS at 20:08

## 2020-07-04 RX ADMIN — HYDRALAZINE HYDROCHLORIDE 10 MG: 20 INJECTION INTRAMUSCULAR; INTRAVENOUS at 04:47

## 2020-07-04 RX ADMIN — TAMSULOSIN HYDROCHLORIDE 0.4 MG: 0.4 CAPSULE ORAL at 08:13

## 2020-07-04 ASSESSMENT — PAIN SCALES - GENERAL
PAINLEVEL_OUTOF10: 0

## 2020-07-04 NOTE — PROGRESS NOTES
Hospitalist Progress Note      SYNOPSIS: Patient admitted on 2020 after CT revealed a large left subdural bleed. Patient was found initially on the floor with expressive aphasia and right-sided facial droop as well as right upper and lower extremity weakness. SUBJECTIVE:    Patient seen and examined  Records reviewed. Feels well. Better than yesterday. Drain removed. Stable overnight. No other overnight issues reported. Temp (24hrs), Av.6 °F (36.4 °C), Min:97.2 °F (36.2 °C), Max:98.2 °F (36.8 °C)    DIET: DIET DENTAL SOFT;  CODE: Full Code    Intake/Output Summary (Last 24 hours) at 2020 0933  Last data filed at 2020 0706  Gross per 24 hour   Intake 1270 ml   Output 1150 ml   Net 120 ml       OBJECTIVE:    /67   Pulse 90   Temp 97.2 °F (36.2 °C) (Bladder)   Resp 13   Ht 6' 2\" (1.88 m)   Wt 149 lb 7.6 oz (67.8 kg)   SpO2 99%   BMI 19.19 kg/m²     General appearance: No acute distress, breathing without difficulty  HEENT: Normocephalic, atraumatic. EOMI  Neck: Supple. Trachea midline  Respiratory: Clear to auscultation bilaterally  Cardiovascular: Regular rate rhythm, normal S1-S2  Abdomen: Soft, nontender, nondistended  Musculoskeletal: Normal range of motion, no deformity  Skin:  No rashes  on visible skin  Neurologic: awake, alert and following commands, cranial nerves II to XII grossly intact    ASSESSMENT:  1.  Subdural hemorrhage that is post bur hole/craniotomy  2. Hypertension  3. Alcohol dependence  4. Moderate malnutrition       PLAN:  1. Neuro critical care following  2. Neurosurgery following  3. Keppra 500 mg twice daily  4. Folic acid and thiamine daily  5. Labetalol 10 mg IV as needed systolic blood pressure greater than 140  6. PM&R following  7.   Anticipate transfer out of NICU today or tomorrow      Medications:  REVIEWED DAILY    Infusion Medications    dextrose       Scheduled Medications    polyethylene glycol  17 g Oral Daily    senna 1 tablet Oral Nightly    tamsulosin  0.4 mg Oral Daily    sodium chloride flush  10 mL Intravenous 2 times per day    thiamine  100 mg Oral Daily    folic acid  1 mg Oral Daily    [Held by provider] lisinopril  20 mg Oral Daily    pantoprazole  40 mg Oral QAM AC    levETIRAcetam  500 mg Oral BID     PRN Meds: sodium chloride flush, [DISCONTINUED] promethazine **OR** ondansetron, glucose, dextrose, glucagon (rDNA), dextrose, hydrALAZINE, labetalol, ipratropium-albuterol, acetaminophen    Labs:     Recent Labs     07/02/20  0306 07/03/20  0420 07/04/20  0500   WBC 8.2 12.2* 7.7   HGB 11.7* 12.8 12.9   HCT 36.5* 39.8 38.8    199 211       Recent Labs     07/02/20  0306 07/02/20  1610 07/03/20  0420 07/04/20  0500    137 138 137   K 3.9 3.6 4.2 3.7    102 101 101   CO2 24 21* 24 22   BUN 19 18 18 19   CREATININE 1.2 1.0 1.2 1.0   CALCIUM 9.3 9.6 9.6 9.6   PHOS 3.3  --  3.2 3.1       Recent Labs     07/02/20  0306   PROT 6.8   ALKPHOS 61   ALT 11   AST 14   BILITOT 0.3       Recent Labs     07/02/20  0306   INR 1.1       Recent Labs     07/02/20  0306   CKTOTAL 104  106   TROPONINI <0.01       Chronic labs:    Lab Results   Component Value Date    CHOL 183 07/02/2020    TRIG 71 07/02/2020    HDL 63 07/02/2020    LDLCALC 106 (H) 07/02/2020    INR 1.1 07/02/2020    LABA1C 5.8 (H) 07/02/2020       Radiology: REVIEWED DAILY    +++++++++++++++++++++++++++++++++++++++++++++++++  [address], [place]  +++++++++++++++++++++++++++++++++++++++++++++++++  NOTE: This report was transcribed using voice recognition software. Every effort was made to ensure accuracy; however, inadvertent computerized transcription errors may be present.

## 2020-07-04 NOTE — PROGRESS NOTES
Pt's bladder scan showed >999cc. Pt straight cath as ordered and drained 725cc. Pt tolerated procedure well. Resident notified. Pt due to void at 0830.

## 2020-07-04 NOTE — PROGRESS NOTES
PO#2  [facility] for Subdural left. Doing much better  No headaches  Alert awake fairly oriented. Moving right side almost normally. Skinny Tinajero removed. Stitch applied.

## 2020-07-04 NOTE — PROGRESS NOTES
Called nurse to nurse report to 80 for transfer. Patient declined family update on transfer destination at this time.

## 2020-07-04 NOTE — PROGRESS NOTES
[facility]  PROGRESS NOTE  ATTENDING NOTE    CRITICAL CARE    Chief Complaint   Patient presents with    Consultation     Pt is transfer from [facility]. Pt was found on the ground with LKW 4 days ago. Pt arrived with [facility] with right nfacial droop/weakness, and aphasia. Pt has subdural hematoma       HPI  Mr. Bora Lagos, a 70y.o. year old male with a past medical history of COPD, hypertension, hyperlipidemia who was transferred from outside facility to [facility] emergency department for abnormal CT finding. Patient was last seen by his neighbors 4 days ago. They with check on him he did not answer the door. EMS was called. He was found on the floor. Noted to have expressive aphasia and right-sided facial droop. Also right upper and lower extremity weakness. Was able to answer yes and no questions but able to give more than 1 word answers. CT was performed at the [facility] which showed a large left subdural bleed.     On arrival to this facility patient's vital signs within normal limits and stable although a bit hypertensive with pressure 157/84. Afebrile. On exam noted to have expressive aphasia. Oriented to person only. Weak on right side. Able to move the left upper and lower extremity without difficulty. Laboratory studies unremarkable. Trauma team did evaluate the patient but did not find anything indicative of traumatic injury. Neurosurgery was consulted patient was taken emergently to the OR for craniotomy/tameka holes.     Patient Active Problem List   Diagnosis    Subdural hematoma ([facility] [address]       OVERNIGHT EVENTS:  Urinary retention    HOSPITAL COURSE:  7/2:  Transferred from [facility]; admitted, tameka hole crani; extubated tolerating diet  7/3:  Continued crani drain  7/4:  Drain removed; flomax started    /62   Pulse 74   Temp 98.3 °F (36.8 °C) (Temporal)   Resp 16   Ht 6' 2\" (1.88 m)   Wt 149 lb 7.6 oz (67.8 kg)   SpO2 100%   BMI 19.19 kg/m² Physical Exam  Constitutional:       Appearance: Normal appearance. HENT:      Head: Normocephalic. Comments: Drain removed this morning     Nose: Nose normal.      Mouth/Throat:      Mouth: Mucous membranes are moist.      Pharynx: Oropharynx is clear. Eyes:      Extraocular Movements: Extraocular movements intact. Pupils: Pupils are equal, round, and reactive to light. Neck:      Musculoskeletal: Normal range of motion and neck supple. Cardiovascular:      Rate and Rhythm: Normal rate and regular rhythm. Pulses: Normal pulses. Heart sounds: Normal heart sounds. Pulmonary:      Effort: Pulmonary effort is normal.      Breath sounds: Normal breath sounds. Abdominal:      General: There is no distension. Palpations: Abdomen is soft. Tenderness: There is no abdominal tenderness. Musculoskeletal:      Right lower leg: No edema. Left lower leg: No edema. Skin:     General: Skin is warm and dry. Neurological:      Mental Status: He is alert. Comments: A/O x 3, right sided weakness--resolved/ 5/5 strength bilateral upper and lower extremities   Psychiatric:         Mood and Affect: Mood normal.         Behavior: Behavior normal.         Thought Content: Thought content normal.         Judgment: Judgment normal.         Lines: Villagran:  remove   Central line:  no  PICC:  no    CAM-ICU:  negative  RASS:  RASS 0 (Alert and Calm)    ASSESSMENT/PLAN:  1.  SDH/TBI  --s/p tameka hole crani  --Keppra x 30 days  --NSGY following  --maintain Na normal  2. Chronic hyponatremia--improved  --recheck Na--improved  --hold HCTZ  3. HTN  --PRN antihypertensives  4. Alcohol abuse--daily rhea  --thiamine, folate  --SW for SBI  --monitor for Withdrawal  --uncertain of the history and is 5 days out from last drink, will monitor for now  5. Moderate malnutrition  --diet  6.   Urinary retention  --start flomax    DVT/GI ppx--bilateral SCDs, diet, protonix    Ann Bullion for monitored floor    Spencer Krabbe, MD, MSc, FACS  7/4/2020  2:05 PM

## 2020-07-05 LAB
ANION GAP SERPL CALCULATED.3IONS-SCNC: 12 MMOL/L (ref 7–16)
BUN BLDV-MCNC: 20 MG/DL (ref 8–23)
CALCIUM IONIZED: 1.34 MMOL/L (ref 1.15–1.33)
CALCIUM SERPL-MCNC: 9.5 MG/DL (ref 8.6–10.2)
CHLORIDE BLD-SCNC: 102 MMOL/L (ref 98–107)
CO2: 23 MMOL/L (ref 22–29)
CREAT SERPL-MCNC: 1.1 MG/DL (ref 0.7–1.2)
GFR AFRICAN AMERICAN: >60
GFR NON-AFRICAN AMERICAN: >60 ML/MIN/1.73
GLUCOSE BLD-MCNC: 93 MG/DL (ref 74–99)
HCT VFR BLD CALC: 38.2 % (ref 37–54)
HEMOGLOBIN: 12.5 G/DL (ref 12.5–16.5)
MAGNESIUM: 1.8 MG/DL (ref 1.6–2.6)
MCH RBC QN AUTO: 29.2 PG (ref 26–35)
MCHC RBC AUTO-ENTMCNC: 32.7 % (ref 32–34.5)
MCV RBC AUTO: 89.3 FL (ref 80–99.9)
PDW BLD-RTO: 14.1 FL (ref 11.5–15)
PHOSPHORUS: 3.5 MG/DL (ref 2.5–4.5)
PLATELET # BLD: 222 E9/L (ref 130–450)
PMV BLD AUTO: 10.1 FL (ref 7–12)
POTASSIUM SERPL-SCNC: 4.3 MMOL/L (ref 3.5–5)
RBC # BLD: 4.28 E12/L (ref 3.8–5.8)
SODIUM BLD-SCNC: 137 MMOL/L (ref 132–146)
WBC # BLD: 6.1 E9/L (ref 4.5–11.5)

## 2020-07-05 PROCEDURE — 2060000000 HC ICU INTERMEDIATE R&B

## 2020-07-05 PROCEDURE — 83735 ASSAY OF MAGNESIUM: CPT

## 2020-07-05 PROCEDURE — 82330 ASSAY OF CALCIUM: CPT

## 2020-07-05 PROCEDURE — 36415 COLL VENOUS BLD VENIPUNCTURE: CPT

## 2020-07-05 PROCEDURE — 6370000000 HC RX 637 (ALT 250 FOR IP): Performed by: SURGERY

## 2020-07-05 PROCEDURE — 84100 ASSAY OF PHOSPHORUS: CPT

## 2020-07-05 PROCEDURE — 85027 COMPLETE CBC AUTOMATED: CPT

## 2020-07-05 PROCEDURE — 80048 BASIC METABOLIC PNL TOTAL CA: CPT

## 2020-07-05 PROCEDURE — 2580000003 HC RX 258: Performed by: SURGERY

## 2020-07-05 RX ADMIN — TAMSULOSIN HYDROCHLORIDE 0.4 MG: 0.4 CAPSULE ORAL at 09:07

## 2020-07-05 RX ADMIN — LEVETIRACETAM 500 MG: 500 TABLET, FILM COATED ORAL at 09:07

## 2020-07-05 RX ADMIN — POLYETHYLENE GLYCOL 3350 17 G: 17 POWDER, FOR SOLUTION ORAL at 09:07

## 2020-07-05 RX ADMIN — FOLIC ACID 1 MG: 1 TABLET ORAL at 09:07

## 2020-07-05 RX ADMIN — SODIUM CHLORIDE, PRESERVATIVE FREE 10 ML: 5 INJECTION INTRAVENOUS at 09:07

## 2020-07-05 RX ADMIN — LEVETIRACETAM 500 MG: 500 TABLET, FILM COATED ORAL at 20:37

## 2020-07-05 RX ADMIN — PANTOPRAZOLE SODIUM 40 MG: 40 TABLET, DELAYED RELEASE ORAL at 05:23

## 2020-07-05 RX ADMIN — SENNOSIDES 8.6 MG: 8.6 TABLET, FILM COATED ORAL at 20:37

## 2020-07-05 RX ADMIN — SODIUM CHLORIDE, PRESERVATIVE FREE 10 ML: 5 INJECTION INTRAVENOUS at 20:37

## 2020-07-05 RX ADMIN — Medication 100 MG: at 09:07

## 2020-07-05 ASSESSMENT — PAIN SCALES - GENERAL
PAINLEVEL_OUTOF10: 0

## 2020-07-05 NOTE — PLAN OF CARE
Problem: Skin Integrity:  Goal: Will show no infection signs and symptoms  Description: Will show no infection signs and symptoms  7/5/2020 1456 by Octavio Siegel RN  Outcome: Met This Shift  7/5/2020 0641 by Luis Schaffer RN  Outcome: Met This Shift  Goal: Absence of new skin breakdown  Description: Absence of new skin breakdown  7/5/2020 1456 by Octavio Siegel RN  Outcome: Met This Shift  7/5/2020 0641 by Luis Schaffer RN  Outcome: Met This Shift     Problem: Falls - Risk of:  Goal: Will remain free from falls  Description: Will remain free from falls  7/5/2020 1456 by Octavio Siegel RN  Outcome: Met This Shift  7/5/2020 0641 by Luis Schaffer RN  Outcome: Met This Shift  Goal: Absence of physical injury  Description: Absence of physical injury  7/5/2020 1456 by Octavio Siegel RN  Outcome: Met This Shift  7/5/2020 0641 by Luis Schaffer RN  Outcome: Met This Shift     Problem: Discharge Planning:  Goal: Participates in care planning  Description: Participates in care planning  Outcome: Met This Shift  Goal: Discharged to appropriate level of care  Description: Discharged to appropriate level of care  Outcome: Met This Shift     Problem: Airway Clearance - Ineffective:  Goal: Ability to maintain a clear airway will improve  Description: Ability to maintain a clear airway will improve  Outcome: Met This Shift     Problem: Anxiety/Stress:  Goal: Level of anxiety will decrease  Description: Level of anxiety will decrease  Outcome: Met This Shift     Problem: Aspiration:  Goal: Absence of aspiration  Description: Absence of aspiration  Outcome: Met This Shift     Problem:  Bowel Function - Altered:  Goal: Bowel elimination is within specified parameters  Description: Bowel elimination is within specified parameters  Outcome: Met This Shift     Problem: Cardiac Output - Decreased:  Goal: Hemodynamic stability will improve  Description: Hemodynamic stability will improve  Outcome: Met This Shift Problem: Fluid Volume - Imbalance:  Goal: Absence of imbalanced fluid volume signs and symptoms  Description: Absence of imbalanced fluid volume signs and symptoms  Outcome: Met This Shift     Problem: Gas Exchange - Impaired:  Goal: Levels of oxygenation will improve  Description: Levels of oxygenation will improve  Outcome: Met This Shift     Problem: Mental Status - Impaired:  Goal: Mental status will be restored to baseline  Description: Mental status will be restored to baseline  Outcome: Met This Shift     Problem: Skin Integrity - Impaired:  Goal: Will show no infection signs and symptoms  Description: Will show no infection signs and symptoms  Outcome: Met This Shift  Goal: Absence of new skin breakdown  Description: Absence of new skin breakdown  Outcome: Met This Shift     Problem: Sleep Pattern Disturbance:  Goal: Appears well-rested  Description: Appears well-rested  Outcome: Met This Shift     Problem: Tissue Perfusion, Altered:  Goal: Circulatory function within specified parameters  Description: Circulatory function within specified parameters  Outcome: Met This Shift     Problem: Tissue Perfusion - Cardiopulmonary, Altered:  Goal: Absence of angina  Description: Absence of angina  Outcome: Met This Shift  Goal: Hemodynamic stability will improve  Description: Hemodynamic stability will improve  Outcome: Met This Shift     Problem: Neurological  Goal: Maximum potential motor/sensory/cognitive function  Outcome: Met This Shift

## 2020-07-05 NOTE — ANESTHESIA POSTPROCEDURE EVALUATION
Department of Anesthesiology  Postprocedure Note    Patient: Mt Machado  MRN: [id number]  [place]: 1945  Date of evaluation: 7/5/2020  Time:  6:33 AM     Procedure Summary     Date:  07/02/20 Room / Location:  [facility] OR 06 / [facility]    Anesthesia Start:  2121 Anesthesia Stop:  2725    Procedure:  [address] (Left ) Diagnosis:  (subdural bleed)    Surgeon:  Tequila Segura MD Responsible Provider:  Nain Black MD    Anesthesia Type:  general ASA Status:  2 - Emergent          Anesthesia Type: No value filed. Jaime Phase I: Jaime Score: 10    Jaime Phase II:      Last vitals: Reviewed and per EMR flowsheets.        Anesthesia Post Evaluation    Patient location during evaluation: PACU  Patient participation: complete - patient participated  Level of consciousness: awake and alert  Airway patency: patent  Nausea & Vomiting: no nausea and no vomiting  Complications: no  Cardiovascular status: hemodynamically stable  Respiratory status: acceptable  Hydration status: euvolemic

## 2020-07-05 NOTE — PLAN OF CARE
Problem: Skin Integrity:  Goal: Will show no infection signs and symptoms  Description: Will show no infection signs and symptoms  Outcome: Met This Shift  Goal: Absence of new skin breakdown  Description: Absence of new skin breakdown  Outcome: Met This Shift     Problem: Falls - Risk of:  Goal: Will remain free from falls  Description: Will remain free from falls  Outcome: Met This Shift  Goal: Absence of physical injury  Description: Absence of physical injury  Outcome: Met This Shift

## 2020-07-05 NOTE — PROGRESS NOTES
PO#3  [facility] for Subdural left. Doing much better  No headaches  Alert awake fairly oriented. Moving right side almost normally. .  Awaiting rehab placement

## 2020-07-05 NOTE — PROGRESS NOTES
Hospitalist Progress Note      SYNOPSIS: Patient admitted on 2020 after CT revealed a large left subdural bleed. Patient was found initially on the floor with expressive aphasia and right-sided facial droop as well as right upper and lower extremity weakness. SUBJECTIVE:    Patient seen and examined  Records reviewed. Feels well. [facility]  Stable overnight. No other overnight issues reported. Temp (24hrs), Av.1 °F (36.7 °C), Min:97.6 °F (36.4 °C), Max:98.4 °F (36.9 °C)    DIET: DIET DENTAL SOFT;  CODE: Full Code    Intake/Output Summary (Last 24 hours) at 2020 1108  Last data filed at 2020 1058  Gross per 24 hour   Intake 1220 ml   Output 675 ml   Net 545 ml       OBJECTIVE:    /72   Pulse 58   Temp 98.4 °F (36.9 °C) (Temporal)   Resp 18   Ht 6' 2\" (1.88 m)   Wt 149 lb 7.6 oz (67.8 kg)   SpO2 97%   BMI 19.19 kg/m²     General appearance: No acute distress, breathing without difficulty  HEENT: Normocephalic, atraumatic. EOMI  Neck: Supple. Trachea midline  Respiratory: Clear to auscultation bilaterally  Cardiovascular: Regular rate rhythm, normal S1-S2  Abdomen: Soft, nontender, nondistended  Musculoskeletal: Normal range of motion, no deformity  Skin:  No rashes  on visible skin  Neurologic: awake, alert and following commands, cranial nerves II to XII grossly intact    ASSESSMENT:  1.  Subdural hemorrhage that is post bur hole/craniotomy  2. Hypertension  3. Alcohol dependence  4. Moderate malnutrition       PLAN:  1. Neurosurgery following  2. Keppra 500 mg twice daily  3. Folic acid and thiamine daily  4. PRN labetalol/hydralazine for SBP greater than 140  5.   PT/OT      Medications:  REVIEWED DAILY    Infusion Medications    dextrose       Scheduled Medications    polyethylene glycol  17 g Oral Daily    senna  1 tablet Oral Nightly    tamsulosin  0.4 mg Oral Daily    sodium chloride flush  10 mL Intravenous 2 times per day    thiamine 100 mg Oral Daily    folic acid  1 mg Oral Daily    [Held by provider] lisinopril  20 mg Oral Daily    pantoprazole  40 mg Oral QAM AC    levETIRAcetam  500 mg Oral BID     PRN Meds: sodium chloride flush, [DISCONTINUED] promethazine **OR** ondansetron, glucose, dextrose, glucagon (rDNA), dextrose, hydrALAZINE, labetalol, ipratropium-albuterol, acetaminophen    Labs:     Recent Labs     07/03/20  0420 07/04/20  0500 07/05/20  0832   WBC 12.2* 7.7 6.1   HGB 12.8 12.9 12.5   HCT 39.8 38.8 38.2    211 222       Recent Labs     07/03/20  0420 07/04/20  0500 07/05/20  0832    137 137   K 4.2 3.7 4.3    101 102   CO2 24 22 23   BUN 18 19 20   CREATININE 1.2 1.0 1.1   CALCIUM 9.6 9.6 9.5   PHOS 3.2 3.1 3.5       No results for input(s): PROT, ALB, ALKPHOS, ALT, AST, BILITOT, AMYLASE, LIPASE in the last 72 hours. No results for input(s): INR in the last 72 hours. No results for input(s): [place] in the last 72 hours. Chronic labs:    Lab Results   Component Value Date    CHOL 183 07/02/2020    TRIG 71 07/02/2020    HDL 63 07/02/2020    LDLCALC 106 (H) 07/02/2020    INR 1.1 07/02/2020    LABA1C 5.8 (H) 07/02/2020       Radiology: REVIEWED DAILY    +++++++++++++++++++++++++++++++++++++++++++++++++  [address] [facility], [place]  +++++++++++++++++++++++++++++++++++++++++++++++++  NOTE: This report was transcribed using voice recognition software. Every effort was made to ensure accuracy; however, inadvertent computerized transcription errors may be present.

## 2020-07-06 LAB
ANION GAP SERPL CALCULATED.3IONS-SCNC: 12 MMOL/L (ref 7–16)
BUN BLDV-MCNC: 20 MG/DL (ref 8–23)
CALCIUM IONIZED: 1.33 MMOL/L (ref 1.15–1.33)
CALCIUM SERPL-MCNC: 9.5 MG/DL (ref 8.6–10.2)
CHLORIDE BLD-SCNC: 101 MMOL/L (ref 98–107)
CO2: 24 MMOL/L (ref 22–29)
CREAT SERPL-MCNC: 1.1 MG/DL (ref 0.7–1.2)
GFR AFRICAN AMERICAN: >60
GFR NON-AFRICAN AMERICAN: >60 ML/MIN/1.73
GLUCOSE BLD-MCNC: 99 MG/DL (ref 74–99)
HCT VFR BLD CALC: 36 % (ref 37–54)
HEMOGLOBIN: 11.5 G/DL (ref 12.5–16.5)
MAGNESIUM: 1.9 MG/DL (ref 1.6–2.6)
MCH RBC QN AUTO: 29.1 PG (ref 26–35)
MCHC RBC AUTO-ENTMCNC: 31.9 % (ref 32–34.5)
MCV RBC AUTO: 91.1 FL (ref 80–99.9)
PDW BLD-RTO: 13.8 FL (ref 11.5–15)
PHOSPHORUS: 3.5 MG/DL (ref 2.5–4.5)
PLATELET # BLD: 220 E9/L (ref 130–450)
PMV BLD AUTO: 9.8 FL (ref 7–12)
POTASSIUM SERPL-SCNC: 4.3 MMOL/L (ref 3.5–5)
RBC # BLD: 3.95 E12/L (ref 3.8–5.8)
SODIUM BLD-SCNC: 137 MMOL/L (ref 132–146)
WBC # BLD: 6.5 E9/L (ref 4.5–11.5)

## 2020-07-06 PROCEDURE — 2580000003 HC RX 258: Performed by: SURGERY

## 2020-07-06 PROCEDURE — 97129 THER IVNTJ 1ST 15 MIN: CPT

## 2020-07-06 PROCEDURE — 97130 THER IVNTJ EA ADDL 15 MIN: CPT

## 2020-07-06 PROCEDURE — 36415 COLL VENOUS BLD VENIPUNCTURE: CPT

## 2020-07-06 PROCEDURE — 6370000000 HC RX 637 (ALT 250 FOR IP): Performed by: SURGERY

## 2020-07-06 PROCEDURE — 85027 COMPLETE CBC AUTOMATED: CPT

## 2020-07-06 PROCEDURE — 2060000000 HC ICU INTERMEDIATE R&B

## 2020-07-06 PROCEDURE — 80048 BASIC METABOLIC PNL TOTAL CA: CPT

## 2020-07-06 PROCEDURE — 82330 ASSAY OF CALCIUM: CPT

## 2020-07-06 PROCEDURE — 97530 THERAPEUTIC ACTIVITIES: CPT

## 2020-07-06 PROCEDURE — 83735 ASSAY OF MAGNESIUM: CPT

## 2020-07-06 PROCEDURE — 84100 ASSAY OF PHOSPHORUS: CPT

## 2020-07-06 PROCEDURE — 97535 SELF CARE MNGMENT TRAINING: CPT

## 2020-07-06 PROCEDURE — 2500000003 HC RX 250 WO HCPCS: Performed by: SURGERY

## 2020-07-06 RX ADMIN — SODIUM CHLORIDE, PRESERVATIVE FREE 10 ML: 5 INJECTION INTRAVENOUS at 21:33

## 2020-07-06 RX ADMIN — FOLIC ACID 1 MG: 1 TABLET ORAL at 07:43

## 2020-07-06 RX ADMIN — LEVETIRACETAM 500 MG: 500 TABLET, FILM COATED ORAL at 21:33

## 2020-07-06 RX ADMIN — LEVETIRACETAM 500 MG: 500 TABLET, FILM COATED ORAL at 07:46

## 2020-07-06 RX ADMIN — SODIUM CHLORIDE, PRESERVATIVE FREE 10 ML: 5 INJECTION INTRAVENOUS at 07:44

## 2020-07-06 RX ADMIN — POLYETHYLENE GLYCOL 3350 17 G: 17 POWDER, FOR SOLUTION ORAL at 07:44

## 2020-07-06 RX ADMIN — LABETALOL HYDROCHLORIDE 10 MG: 5 INJECTION INTRAVENOUS at 15:40

## 2020-07-06 RX ADMIN — Medication 100 MG: at 07:43

## 2020-07-06 RX ADMIN — TAMSULOSIN HYDROCHLORIDE 0.4 MG: 0.4 CAPSULE ORAL at 07:44

## 2020-07-06 RX ADMIN — SODIUM CHLORIDE, PRESERVATIVE FREE 10 ML: 5 INJECTION INTRAVENOUS at 15:40

## 2020-07-06 ASSESSMENT — PAIN SCALES - GENERAL
PAINLEVEL_OUTOF10: 0

## 2020-07-06 NOTE — PROGRESS NOTES
to improve safety & prevent falls and allow pt to return home safely. Comments: Upon arrival pt was in bed & agreeable for therapy. At end of session pt was seated in chair, chair alarm set, all lines and tubes intact & call light within reach. · Pt has made Good progress towards set goals. · Continue with current plan of care      Treatment Time In: 11:15am            Treatment Time Out: 11:45am              Treatment Charges: Mins Units   Ther Ex  [id number]     Manual Therapy Grady Durbin [id number]     ADL/Home Mgt [id number]     Neuro Re-ed [id number]     Group Therapy      Orthotic manage/training  [id number]     Non-Billable Time     Total Timed Treatment 30 2       Angelita LEMUS.  [address] [place] [address] Drive, [address] [place] [address]

## 2020-07-06 NOTE — PROGRESS NOTES
Hospitalist Progress Note      SYNOPSIS: Patient admitted on 2020 after CT revealed a large left subdural bleed. Patient was found initially on the floor with expressive aphasia and right-sided facial droop as well as right upper and lower extremity weakness. SUBJECTIVE:    Patient seen and examined  Records reviewed. Feels well. [facility]  Stable overnight. No other overnight issues reported. Temp (24hrs), Av.5 °F (36.4 °C), Min:97 °F (36.1 °C), Max:98.1 °F (36.7 °C)    DIET: DIET DENTAL SOFT;  CODE: Full Code    Intake/Output Summary (Last 24 hours) at 2020 1051  Last data filed at 2020 0605  Gross per 24 hour   Intake 1080 ml   Output 1225 ml   Net -145 ml       OBJECTIVE:    BP (!) 140/72   Pulse 66   Temp 97.6 °F (36.4 °C)   Resp 16   Ht 6' 2\" (1.88 m)   Wt 149 lb 7.6 oz (67.8 kg)   SpO2 97%   BMI 19.19 kg/m²     General appearance: No acute distress, breathing without difficulty  HEENT: Normocephalic, atraumatic. EOMI  Neck: Supple. Trachea midline  Respiratory: Clear to auscultation bilaterally  Cardiovascular: Regular rate rhythm, normal S1-S2  Abdomen: Soft, nontender, nondistended  Musculoskeletal: Normal range of motion, no deformity  Skin:  No rashes  on visible skin  Neurologic: awake, alert and following commands, cranial nerves II to XII grossly intact    ASSESSMENT:  1.  Subdural hemorrhage s/p bur hole/craniotomy  2. Hypertension  3. Alcohol dependence  4. Moderate malnutrition       PLAN:  1. Neurosurgery following  2. Keppra 500 mg twice daily  3. Folic acid and thiamine daily  4. PRN labetalol/hydralazine for SBP greater than 140  5. PT/OT  6.   Case management working on placement at discharge      Medications:  REVIEWED DAILY    Infusion Medications    dextrose       Scheduled Medications    polyethylene glycol  17 g Oral Daily    senna  1 tablet Oral Nightly    tamsulosin  0.4 mg Oral Daily    sodium chloride flush  10 mL Intravenous 2 times per day    thiamine  100 mg Oral Daily    folic acid  1 mg Oral Daily    [Held by provider] lisinopril  20 mg Oral Daily    pantoprazole  40 mg Oral QAM AC    levETIRAcetam  500 mg Oral BID     PRN Meds: sodium chloride flush, [DISCONTINUED] promethazine **OR** ondansetron, glucose, dextrose, glucagon (rDNA), dextrose, hydrALAZINE, labetalol, ipratropium-albuterol, acetaminophen    Labs:     Recent Labs     07/04/20  0500 07/05/20  0832 07/06/20  0608   WBC 7.7 6.1 6.5   HGB 12.9 12.5 11.5*   HCT 38.8 38.2 36.0*    222 220       Recent Labs     07/04/20  0500 07/05/20  0832 07/06/20  0608    137 137   K 3.7 4.3 4.3    102 101   CO2 22 23 24   BUN 19 20 20   CREATININE 1.0 1.1 1.1   CALCIUM 9.6 9.5 9.5   PHOS 3.1 3.5 3.5       No results for input(s): PROT, ALB, ALKPHOS, ALT, AST, BILITOT, AMYLASE, LIPASE in the last 72 hours. No results for input(s): INR in the last 72 hours. No results for input(s): Daleen Cocks in the last 72 hours. Chronic labs:    Lab Results   Component Value Date    CHOL 183 07/02/2020    TRIG 71 07/02/2020    HDL 63 07/02/2020    LDLCALC 106 (H) 07/02/2020    INR 1.1 07/02/2020    LABA1C 5.8 (H) 07/02/2020       Radiology: REVIEWED DAILY    +++++++++++++++++++++++++++++++++++++++++++++++++  [address], [place]  +++++++++++++++++++++++++++++++++++++++++++++++++  NOTE: This report was transcribed using voice recognition software. Every effort was made to ensure accuracy; however, inadvertent computerized transcription errors may be present.

## 2020-07-06 NOTE — CARE COORDINATION
Met with the patient at the bedside to discuss transition of care planning. Explained that the patient is no longer appropriate for ARU. Discussed HHC vs Outpatient therapy at discharge. Patient is agreeable for [facility] and he would like [facility]. Call placed to [facility] at [facility] and referral made. Clinical information faxed to [facility] at [phone]. Will need [facility] orders for PT, OT, and nursing for wound management, med compliance, and vitals. Patient ambulating with no AD per PT note today. Patient lives in a first floor apartment independently with no steps to enter. Patient's uncle to provide transportation home when patient is medically stable to discharge. Anticipate discharge to home tomorrow. Steph Veloztle:  [phone]      The Plan for Transition of Care is related to the following treatment goals: Improve functional mobility and stability at home. The Patient and/or patient representative was provided with a choice of provider and agrees   with the discharge plan. [x] Yes [] No    Freedom of choice list was provided with basic dialogue that supports the patient's individualized plan of care/goals, treatment preferences and shares the quality data associated with the providers.  [x] Yes [] No

## 2020-07-06 NOTE — PROGRESS NOTES
PT and OT notes appreciated. Patient is highly functional at SBA level and will not meet medical necessity for an ARU stay. Discussed with Dr. Toy Marina and will rec: home with [facility]. Randell manager, Conception Ian and Social Work, Sid Goff notified.

## 2020-07-06 NOTE — PROGRESS NOTES
Acute Rehab Pre-Admission Screen      Referral date: 7/2/2020  Onset/Hospital Admit Date: 7/2/2020  2:49 AM    Current Location: 8507/8507-B    Name: Henri Fabian: 1945  Age: 70 y.o. Admitting Diagnosis: SDH   Address: [address] [place], [place] [address], [place], [address]  Home Phone: [phone] (home)  Social Security #:     Sex: male  Race:   Marital Status: single   Ethnic/Cultural/Bahai Considerations: none noted    Advanced Directives: [x] Full Code  [] [facility] [] Medications only       [x] Living Will  [x] DPOA      []Organ donor      [] No mechanical breathing or ventilation     [] no tube feeding, nutrition or hydration      [] Patient does not have advanced directives or living will     Copies in Chart: no    COVERAGE INFORMATION   Primary Insurer: [facility]  Payor Contact:   Phone:   FAX:  Authorization #:   Secondary Insurer: [email or web address]  Medicare #:   Medicaid #:   Verified coverage: [] Patient  [] Family/caregiver    [x] financial department [] insurance carrier    MEDICAL UPDATE:  History of present admission: Presents 7/2/2020  Patient was brought from outlying facility due to abnormal CT. Patient was last seen normal 4 days ago. Patient was found on the ground. Patient was altered outlying facility and had weakness on right side. Patient was diagnosed with subdural hematoma. Patient unable to give history and unable to state when he had fallen. Or if he did fall. -   on him and he did not answer door. EMS called. Found to have expressive aphasia, right sided facial droop and RUE and RLE weakness. He intially presented to [facility], was found to have large L septated SDH with mixed density and 12mm L to R shift with acute on subacute components.   7/2/2020 - Emergent Surgery for CRANIOTOMY ELLIS HOLES FOR SUBDURAL BLEED  7/4/2020 drain removed    PHYSICIAN / REFERRAL INFORMATION  Referring Physician: Bhavani Mathis  Attending Physician: Jayson Caldwell DO  Primary Care Physician: No primary care provider on file. Consultants/Opinions (see full consult notes on chart): SHARON Harris 187  - Trauma    SOCIAL INFORMATION  Primary  Contact: Dutch Clark  Relationship:   Primary Phone: [phone]  Secondary Phone:   Secondary  Contact:   Relationship:   Primary Phone:   Secondary Phone:     Previous Community Services: none noted  Caregiver available: [] Yes [] No Hours per day available:   Patient previously employed:  [] Yes [] Part Time [] Full Time [x] No [] Retired  Occupation/Profession:   Prior living arrangements: [x] Home  [] Assisted living  [] SNF [] Other  Lived with:  [x] Alone  [] Spouse  [] Family  [] Other  Lived with: 1  Contact phone: 0  Home:  1 story home  0 entry steps  Rails:      Bedroom: [x] 1st floor  [] 2nd floor    Bathroom:  [x] 1st floor  [] 2nd floor    Prior Functional Level: Independent for: self care mobility  Assistance for:   Dependent for:   Dominant hand: [] Right  [] Left    Previous Home Equipment:  [] Cane [] Grab bars [] Orthotic / prosthetic   [] Shower chair [] Tub bench  [] 3-in-1 Commode [] Long handle sponge   [] Oxygen [] Sock aide  [] Wheelchair  [] motorized wc/scooter  [] Wheelchair cushion   [] Crutches [] Long handle shoehorn  [] Reachers [] Toilet seat elevator [] Rollator  [] Walker(wheeled)   [] Walker(standard) [] Mechanical lift    [x] None of the above     Has patient had 2 or more falls in the past year or any fall with injury in the past year? [x] yes   [] no   []unknown    Has patient had major surgery during past 100 days prior to admission?    [x] yes   [] no Type/ Date: 7/2/2020 - CRANIOTOMY ELLIS HOLES FOR SUBDURAL BLEED    Surgical History:  Past Surgical History:   Procedure Laterality Date    CRANIOTOMY Left 7/2/2020    CRANIOTOMY ELLIS HOLES FOR SUBDURAL BLEED performed by Diane Obrien MD at [address] [place] [address]       Past Medical:  Past Medical History:   Diagnosis Date    BPH BMI: 19.19       Date: 7/6/2020 Date:  Date:    temperature 97.3     pulse 64     respirations 18     Blood pressure 111/58     Pulse oximeter 98% room air        ALLERGIES: Patient has no known allergies. DIET : DIET DENTAL SOFT;    Current Lab and Diagnostic Tests:   Recent Results (from the past 24 hour(s))   CBC    Collection Time: 07/06/20  6:08 AM   Result Value Ref Range    WBC 6.5 4.5 - 11.5 E9/L    RBC 3.95 3.80 - 5.80 E12/L    Hemoglobin 11.5 (L) 12.5 - 16.5 g/dL    Hematocrit 36.0 (L) 37.0 - 54.0 %    MCV 91.1 80.0 - 99.9 fL    MCH 29.1 26.0 - 35.0 pg    MCHC 31.9 (L) 32.0 - 34.5 %    RDW 13.8 11.5 - 15.0 fL    Platelets 331 764 - 686 E9/L    MPV 9.8 7.0 - 12.0 fL   Basic metabolic panel    Collection Time: 07/06/20  6:08 AM   Result Value Ref Range    Sodium 137 132 - 146 mmol/L    Potassium 4.3 3.5 - 5.0 mmol/L    Chloride 101 98 - 107 mmol/L    CO2 24 22 - 29 mmol/L    Anion Gap 12 7 - 16 mmol/L    Glucose 99 74 - 99 mg/dL    BUN 20 8 - 23 mg/dL    CREATININE 1.1 0.7 - 1.2 mg/dL    GFR Non-African American >60 >=60 mL/min/1.73    GFR African American >60     Calcium 9.5 8.6 - 10.2 mg/dL   Calcium, ionized    Collection Time: 07/06/20  6:08 AM   Result Value Ref Range    Calcium, Ion 1.33 1.15 - 1.33 mmol/L   Phosphorus    Collection Time: 07/06/20  6:08 AM   Result Value Ref Range    Phosphorus 3.5 2.5 - 4.5 mg/dL   Magnesium    Collection Time: 07/06/20  6:08 AM   Result Value Ref Range    Magnesium 1.9 1.6 - 2.6 mg/dL     Xr Pelvis (1-2 Views)  Result Date: 7/2/2020  No significant abnormal findings. Positioning variance from ideal.    Ct Head Wo Contrast  Result Date: 7/3/2020  1. Placement of a left frontal percutaneous subdural catheter for drainage of the left subdural hematoma. 2. Significant drainage of the left subdural hematoma. 3. There is a prominence of the peripheral CSF spaces over the cerebral convexities to the previous brain parenchyma compression.  4. Significant decrease in of therapy intervention    Required treatments/services: [x] Rehabilitation nursing [] Dietitian / nurtition                 [x] Case management  [] Respiratory Therapy      [x] Social work   [] Other     Required Therapy:  Therapy Hours per Day Days per Week Therapeutic Interventions Required   [x] Physical Therapy  5-7    [x] Occupational Therapy  5-7    [] Speech Pathology      [] Prosthetics / Orthotics       []         Anticipated Discharge Plan:   Anticipated DME Needs:  [x] Home     [] Commode   [x] Alone    [] Wheelchair   [] Supervised    [] Walker   [] Assist    [] Oxygen        [] Hospital Bed  [] Assisted Living    [] Ramp        [x] To Be Determined    Anticipated Home Health Services:  Anticipated Outpatient Services:  [] PT       [] PT  [] OT      [] OT  [] Speech     [] Speech  [] Nursing     [] Dialysis  [] Aide      [x] To Be Determined  [x] To Be Determined    Anticipated support group:  [] Amputation  [] Multiple Sclerosis  [] Stroke  [x] Brain Injury  [] Spinal cord injury  [] Other     Barriers to discharge: lives alone    Discharge Support: [x] Patient lives alone and does not have a caregiver available     [] Patient has a caregiver available     [] Discharge plan has been verified with patient's caregiver      [] Caregiver is in agreement with the discharge plan     Expected functional status for safe discharge:     Patient/support person goals:     Expected length of stay:     Discussed expected length of stay and agreeable to IRF plan: [] Yes   [] No    Impairment Group Category:     Etiological Diagnosis:     Primary Rehabilitation Diagnosis:     Electronically signed by Ed Morales RN on 7/6/2020 at 8:37 AM    Prescreen completed __________________________________ (signature of prescreener)    Date:    Time:      JUSTIFICATION FOR ADMISSION TO ACUTE REHABILITATION:          RECOMMEND LEVEL OF CARE  Recommend inpatient rehabilitation: [] Yes   [x] No  If no indicate reason:  [x] Functional level too high  [] Unmotivated  [] No insurance carrier approval [] Unlikely to return to community  [] No medical necessity  [] Patient or family chose other facility  [] Too medically complex  [] Inadequate discharge plan  [] Rehabilitation bed unavailable [] Functional level too low  [] patient or family refused ARU    If patient not accepted for IRF admission, recommended level of care:  [] [address]  [] [address]  [] [facility]   [x] Home Care  [] Other      [] LTAC       Physician Assigned:  [] Dr. Violet Pennington         [x] Dr. Jaleel Dove              [] Dr. Cathi Salazar [] Dr. Jimena Malagon  [] Dr. Robledo Chuyita:    ____________________________________________________________________  ____________________________________________________________________  ____________________________________________________________________  ____________________________________________________________________  ____________________________________________________________________      Physician Signature:_____________________________________    Print Signature:_________________________________________    Date:    Time:        PRE-SCREEN ASSESSMENT UPDATE (if not admitted within 48 hours of initial pre-screen)    Medical Update/Changes:     Functional Update/Changes:     Reviewer Signature:_____________________________________    Date:   Time:     PHYSICIAN ADMISSION DETERMINATION AND REVIEW UPDATE:     ____________________________________________________________________  ____________________________________________________________________  ____________________________________________________________________  ____________________________________________________________________  ____________________________________________________________________    Physician Signature:_____________________________________    Print Signature:_________________________________________    Date:     Time:

## 2020-07-06 NOTE — PROGRESS NOTES
Physical Therapy  Treatment Note    Name: Dane Salas  :   MRN: [id number]    Referring Provider:  JOHN Murray CNP    Date of Service: 2020    Evaluating PT:  Mariann Galvan PT, DPT [id number]    Room #:  4343/7035-A  Diagnosis:  SDH  Precautions: Falls, BP < 140, Subdural drain  Procedure/Surgery:   tameka hole craniotomy   PMHx/PSHx:  COPD, HLD, HTN  Equipment Needs:  TBD    SUBJECTIVE:    Pt lives alone in a 1 story home with level entry. Pt ambulated with no device and was independent PTA. OBJECTIVE:   Initial Evaluation  Date: 20 Treatment  Date: 2020 Short Term/ Long Term   Goals   AM-PAC 6 Clicks 59/64     Was pt agreeable to Eval/treatment? Yes yes    Does pt have pain? No c/o pain none    Bed Mobility  Rolling: NT  Supine to sit: ModA  Sit to supine: NT  Scooting: ModA SBA   Independent   Transfers Sit to stand: ModA  Stand to sit: ModA  Stand pivot: ModA without device SBA Independent   Ambulation   3 feet with ModA without device 150 feet SBA no  feet no AD Independent   Stair negotiation: ascended and descended NT 4 steps 1 rail SBA 4 steps 1 rail Slava   ROM BUE:  Defer to OT note  BLE:  WNL     Strength BUE:  Defer to OT note  LLE:  4/5  RLE: 3+/5 through function  Increase by 1/3 MMT grade   Balance Sitting EOB:  Christo  Dynamic Standing:  ModA without device Sitting EOB: SBA  Dynamic standing: SBA no AD Sitting EOB:  Independent  Dynamic Standing:  Independent     Pt is A & O x 4 (1/3 word recall with cues)  Sensation:  Pt denies numbness and tingling to extremities  Edema:  unremarkable      Patient education  Pt educated on role of PT intervention. Pt educated on safety in room with utilization of call light for assistance with mobility. Pt educated on importance of independent performance of therapeutic exercises designated above for improved strength, activity tolerance, and ROM.       Patient response to education:   Pt verbalized understanding Pt demonstrated skill Pt requires further education in this area   yes yes yes     ASSESSMENT:    Comments:  RN cleared pt for activity prior to session. Pt received supine in bed and agreeable to PT intervention with OT collaboration at this time. Pt received incontinent of BM. Hygiene care was performed. Pt performed all functional mobility as noted above. Pt demonstrating significant improvement in functional mobility at this time but is demonstrating a questionable level of cognition. Pt alert and oriented x 4 but has poor word recall. Pt also received incontinent of bowel and stating he was waiting on the nurse to come but he had yet to use his call bell. At end of session pt placed in bedside chair with chair alarm set. Pt would benefit from continued skilled PT intervention for the purposes of maximizing functional mobility and independence for safe return to home. Treatment:  Patient practiced and was instructed in the following treatment:     Therapeutic Activities Completed:  o Functional mobility as noted above:   - Bed mobility: SBA cueing for safety  - Transfer training: SBA cueing for safety. Initially performed with front [address] but pt progressed to no AD this session.   - Ambulation:  feet x 2 reps no AD. Pt mildly unsteady with occasional scissoring gait but was able to self-correct without assistance. - Stair Trainin steps 1 rail SBA  o Skilled repositioning in seated position for comfort.  o Pt education as noted above.  o Pt hygiene care for incontinence of BM. PLAN:    Patient is making good progress towards established goals. Will continue with current POC.       Time in  1115  Time out  1145    Total Treatment Time  30 minutes     CPT codes:  [] Gait training 07292 0 minutes  [] Manual therapy 63428 0 minutes  [x] Therapeutic activities 55018 30 minutes  [] Therapeutic exercises 69874 0 minutes  [] Neuromuscular reeducation 78087 0 minutes    Modesto Dobbs, PT, T  [id number]

## 2020-07-06 NOTE — PROGRESS NOTES
PO#4  [facility] for Subdural left. Doing much better  No headaches  Alert awake fairly oriented.   No neuro deficit  Awaiting rehab placement

## 2020-07-06 NOTE — PROGRESS NOTES
SPEECH LANGUAGE PATHOLOGY  DAILY PROGRESS NOTE        PATIENT NAME:  Josué Madsen      :  1945          TODAY'S DATE:  2020 ROOM:  [facility]850B    Pt was seated upright in his room when clinician entered the room. Pt denied feeling any pain or any respiratory problems. Pt was on room air. Pt said he felt that he was at baseline cognitively. Pt was able to sequence with 100% accuracy (5/5) with minimal clinician prompt. Pt was able to answer problem solving questions with 75% accuracy (3/4) with minimal clinician prompt and model. Pt was able to provide clinician with the medication he takes and at what time of the day he takes it. Pt was introduced to diaphragmatic breathing vs. clavical breathing if he felt like he was not getting enough air support when he was speaking.       CPT code(s) [id number]  therapeutic interventions that focus on cognitive function , initial  15 min  75052  therapeutic interventions that focus on cognitive function, each additional 15 min  Total minutes :  30 minutes  Marisel JACK Graduate Clinician

## 2020-07-07 VITALS
TEMPERATURE: 98.4 F | DIASTOLIC BLOOD PRESSURE: 70 MMHG | OXYGEN SATURATION: 99 % | SYSTOLIC BLOOD PRESSURE: 128 MMHG | RESPIRATION RATE: 16 BRPM | WEIGHT: 149.47 LBS | HEIGHT: 74 IN | HEART RATE: 78 BPM | BODY MASS INDEX: 19.18 KG/M2

## 2020-07-07 LAB
ANION GAP SERPL CALCULATED.3IONS-SCNC: 11 MMOL/L (ref 7–16)
BUN BLDV-MCNC: 18 MG/DL (ref 8–23)
CALCIUM IONIZED: 1.35 MMOL/L (ref 1.15–1.33)
CALCIUM SERPL-MCNC: 9.3 MG/DL (ref 8.6–10.2)
CHLORIDE BLD-SCNC: 102 MMOL/L (ref 98–107)
CO2: 25 MMOL/L (ref 22–29)
CREAT SERPL-MCNC: 1.2 MG/DL (ref 0.7–1.2)
GFR AFRICAN AMERICAN: >60
GFR NON-AFRICAN AMERICAN: >60 ML/MIN/1.73
GLUCOSE BLD-MCNC: 95 MG/DL (ref 74–99)
HCT VFR BLD CALC: 36.7 % (ref 37–54)
HEMOGLOBIN: 11.7 G/DL (ref 12.5–16.5)
MAGNESIUM: 2 MG/DL (ref 1.6–2.6)
MCH RBC QN AUTO: 29.3 PG (ref 26–35)
MCHC RBC AUTO-ENTMCNC: 31.9 % (ref 32–34.5)
MCV RBC AUTO: 92 FL (ref 80–99.9)
PDW BLD-RTO: 13.9 FL (ref 11.5–15)
PHOSPHORUS: 3.3 MG/DL (ref 2.5–4.5)
PLATELET # BLD: 238 E9/L (ref 130–450)
PMV BLD AUTO: 10.2 FL (ref 7–12)
POTASSIUM SERPL-SCNC: 4.4 MMOL/L (ref 3.5–5)
RBC # BLD: 3.99 E12/L (ref 3.8–5.8)
SODIUM BLD-SCNC: 138 MMOL/L (ref 132–146)
WBC # BLD: 5.9 E9/L (ref 4.5–11.5)

## 2020-07-07 PROCEDURE — 6370000000 HC RX 637 (ALT 250 FOR IP): Performed by: SURGERY

## 2020-07-07 PROCEDURE — 80048 BASIC METABOLIC PNL TOTAL CA: CPT

## 2020-07-07 PROCEDURE — 82330 ASSAY OF CALCIUM: CPT

## 2020-07-07 PROCEDURE — 2580000003 HC RX 258: Performed by: SURGERY

## 2020-07-07 PROCEDURE — 36415 COLL VENOUS BLD VENIPUNCTURE: CPT

## 2020-07-07 PROCEDURE — 83735 ASSAY OF MAGNESIUM: CPT

## 2020-07-07 PROCEDURE — 6360000002 HC RX W HCPCS: Performed by: SURGERY

## 2020-07-07 PROCEDURE — 84100 ASSAY OF PHOSPHORUS: CPT

## 2020-07-07 PROCEDURE — 85027 COMPLETE CBC AUTOMATED: CPT

## 2020-07-07 RX ORDER — LEVETIRACETAM 500 MG/1
500 TABLET ORAL 2 TIMES DAILY
Qty: 60 TABLET | Refills: 3 | Status: SHIPPED | OUTPATIENT
Start: 2020-07-07

## 2020-07-07 RX ORDER — LANOLIN ALCOHOL/MO/W.PET/CERES
100 CREAM (GRAM) TOPICAL DAILY
Qty: 30 TABLET | Refills: 3 | Status: SHIPPED | OUTPATIENT
Start: 2020-07-08

## 2020-07-07 RX ORDER — TAMSULOSIN HYDROCHLORIDE 0.4 MG/1
0.4 CAPSULE ORAL DAILY
Qty: 30 CAPSULE | Refills: 3 | Status: SHIPPED | OUTPATIENT
Start: 2020-07-08

## 2020-07-07 RX ORDER — FOLIC ACID 1 MG/1
1 TABLET ORAL DAILY
Qty: 30 TABLET | Refills: 3 | Status: SHIPPED | OUTPATIENT
Start: 2020-07-08

## 2020-07-07 RX ADMIN — FOLIC ACID 1 MG: 1 TABLET ORAL at 08:03

## 2020-07-07 RX ADMIN — PANTOPRAZOLE SODIUM 40 MG: 40 TABLET, DELAYED RELEASE ORAL at 06:00

## 2020-07-07 RX ADMIN — SODIUM CHLORIDE, PRESERVATIVE FREE 10 ML: 5 INJECTION INTRAVENOUS at 08:05

## 2020-07-07 RX ADMIN — Medication 100 MG: at 08:03

## 2020-07-07 RX ADMIN — LEVETIRACETAM 500 MG: 500 TABLET, FILM COATED ORAL at 08:12

## 2020-07-07 RX ADMIN — TAMSULOSIN HYDROCHLORIDE 0.4 MG: 0.4 CAPSULE ORAL at 08:02

## 2020-07-07 RX ADMIN — HYDRALAZINE HYDROCHLORIDE 10 MG: 20 INJECTION INTRAMUSCULAR; INTRAVENOUS at 00:02

## 2020-07-07 ASSESSMENT — PAIN SCALES - GENERAL: PAINLEVEL_OUTOF10: 0

## 2020-07-07 NOTE — CARE COORDINATION
Discharge orders noted. Call placed to Yuval Wade, liaison with [facility]. To notify of planned discharge for today. [facility] orders noted and faxed to Yuval Wade at [phone]. Anticipate discharge to home today. Will continue to follow.      Lana Benitez.  P:  [phone]

## 2020-07-07 NOTE — DISCHARGE SUMMARY
Hospitalist Discharge Summary    Patient ID: Adriana Pretty   Patient : 1945  Patient's PCP: Kimmie Garcia    Admit Date: 2020   Admitting Physician: Venkata Spaulding MD    Discharge Date:  2020  Discharge Physician: Denis Dukes DO   Discharge Condition: Stable  Discharge Disposition: Home with [facility] course in brief:  (Please refer to daily progress notes for a comprehensive review of the hospitalization by requesting medical records)  Mr. Adriana Pretty, a 70y.o. year old male with a past medical history of COPD, hypertension, hyperlipidemia who was transferred from outside facility to [facility] emergency department for abnormal CT finding. Patient was last seen by his neighbors 4 days ago. They with check on him he did not answer the door. EMS was called. He was found on the floor. Noted to have expressive aphasia and right-sided facial droop. Also right upper and lower extremity weakness. Was able to answer yes and no questions but able to give more than 1 word answers. CT was performed at the [facility] which showed a large left subdural bleed.     On arrival to this facility patient's vital signs within normal limits and stable although a bit hypertensive with pressure 157/84. Afebrile. On exam noted to have expressive aphasia. Oriented to person only. Weak on right side. Able to move the left upper and lower extremity without difficulty. Laboratory studies unremarkable. Trauma team did evaluate the patient but did not find anything indicative of traumatic injury. Neurosurgery was consulted patient was taken emergently to the OR for craniotomy/tameka holes. Patient improved significantly over the next 5 days. He was transferred out of the ICU. He was alert and oriented x4 with and able to perform ADLs without difficulty.   He was discharged home on 2020      Consults:   IP CONSULT TO TRAUMA SURGERY  IP CONSULT TO INTERNAL MEDICINE  IP CONSULT TO NEUROSURGERY  IP CONSULT TO PHARMACY  PHARMACY TO CHANGE BASE FLUIDS  IP CONSULT TO SOCIAL WORK  IP CONSULT TO NEUROCRITICAL CARE  IP CONSULT TO PHYSICAL MEDICINE REHAB  IP CONSULT TO PHYSICAL MEDICINE REHAB  IP CONSULT TO SOCIAL WORK  IP CONSULT TO HOME CARE NEEDS    Discharge Diagnoses:  Subdural hemorrhage status post tameka hole/craniotomy  Moderate malnutrition    Discharge Instructions / Follow up:    Continued appropriate risk factor modification of blood pressure, diabetes and serum lipids will remain essential to reducing risk of future atherosclerotic development    Activity: activity as tolerated    Significant labs:  CBC:   Recent Labs     20  0832 20  0608 20  0535   WBC 6.1 6.5 5.9   RBC 4.28 3.95 3.99   HGB 12.5 11.5* 11.7*   HCT 38.2 36.0* 36.7*   MCV 89.3 91.1 92.0   RDW 14.1 13.8 13.9    220 238     BMP:   Recent Labs     20  0832 20  0608 20  0535    137 138   K 4.3 4.3 4.4    101 102   CO2 23 24 25   BUN 20 20 18   CREATININE 1.1 1.1 1.2   MG 1.8 1.9 2.0   PHOS 3.5 3.5 3.3     LFT:  No results for input(s): PROT, ALB, ALKPHOS, ALT, AST, BILITOT, AMYLASE, LIPASE in the last 72 hours. PT/INR: No results for input(s): INR, APTT in the last 72 hours. BNP: No results for input(s): BNP in the last 72 hours.   Hgb A1C:   Lab Results   Component Value Date    LABA1C 5.8 (H) 2020     Folate and B12: No results found for: [id number], No results found for: FOLATE  Thyroid Studies: No results found for: TSH, T3MEVGG, N5QDZIG, THYROIDAB    Urinalysis:    Lab Results   Component Value Date    NITRU Negative 2020    WBCUA 10-20 2020    BACTERIA MANY 2020    RBCUA 0-1 2020    BLOODU Negative 2020    SPECGRAV 1.025 2020    GLUCOSEU Negative 2020       Imaging:  Xr Pelvis (1-2 Views)    Result Date: 2020  Patient MRN:  [id number] : 1945 Age: 70 years Gender: Male Order Date:  2020 4:15 AM EXAM: XR PELVIS (1-2 VIEWS) NUMBER OF IMAGES:  1 INDICATION:  Acute fall with pelvic trauma. SDH SDH COMPARISON: None FINDINGS: The bones are aligned anatomically. No evidence of fracture or dislocation. Normal mineralization. Soft tissues unremarkable. Pelvis is rotated to the left. No significant abnormal findings. Positioning variance from ideal.    Ct Head Wo Contrast    Result Date: 7/3/2020  Patient MRN:  [id number] : 1945 Age: 70 years Gender: Male Order Date:  7/3/2020 6:00 AM TECHNIQUE/NUMBER OF IMAGES/COMPARISON/CLINICAL HISTORY: CT brain Axial images were obtained sagittal and coronal reconstructions Comparison study  performed earlier. After surgery. FINDINGS: Patient has a tameka hole in the left frontal region with placement of a percutaneous pleural catheter for drainage of the left subdural hematoma. The there is air in the subdural space related with the recent placement of the catheter. The there is significant decreasing midline shifted to the right. The present midline shifted to the right is a about 3 mm there are. The previously it was 9 mm. There is also significant decreased in the subdural collection except where postoperative pneumocranium is present which measures 16 mm but previously was 18 mm. There is better visualization of the gray and white matter interface in the left cerebral hemisphere. There is a prominence of the peripheral CSF spaces over the cerebral convexities to the previous brain parenchyma compression. 1. Placement of a left frontal percutaneous subdural catheter for drainage of the left subdural hematoma. 2. Significant drainage of the left subdural hematoma. 3. There is a prominence of the peripheral CSF spaces over the cerebral convexities to the previous brain parenchyma compression. 4. Significant decrease in midline shifted to the right. 5. Continued follow-up study recommended.     Ct Head Wo Contrast    Result Date: 2020  Patient MRN: [id number] : 1945 Age:  70 years Gender: Male Order Date: 2020 4:30 AM Exam: CT HEAD WO CONTRAST Number of Images: 153 views Indication:  Patient discovered unconscious. Left subdural hematoma. Follow-up. Comparison: 2020, 0048 hours. TECHNIQUE: Region of study: Head. CT images acquired without intravenous contrast. One or more of these dose optimization techniques were utilized: Automated exposure control; mA and/or kV adjustment per patient size (includes targeted exams where dose is matched to clinical indication); or iterative reconstruction. FINDINGS: Left-sided acute on subacute subdural hematoma again noted. The quantity of dense rebleed appears slightly greater. The degree of left-to-right midline shift has modestly increased from roughly 7 mm to 9 mm. The bony calvarium is unremarkable. ALERT:  THIS IS AN ABNORMAL REPORT Evidence of some interval progression of subdural hemorrhage on the left greater quantity of dense rebleed compared to the prior study. Left-to-right midline shift appears modestly increased. Ct Cervical Spine Wo Contrast    Result Date: 2020  Patient MRN: [id number] : 1945 Age:  70 years Gender: Male Order Date: 2020 3:00 AM Exam: CT CERVICAL SPINE WO CONTRAST Number of Images: 379 views Indication:  Patient discovered unconscious. Suspected C-spine trauma. Comparison: None. TECHNIQUE: Region of study: Cervical spine. CT images acquired without intravenous contrast. One or more of these dose optimization techniques were utilized: Automated exposure control; mA and/or kV adjustment per patient size (includes targeted exams where dose is matched to clinical indication); or iterative reconstruction. FINDINGS: No acute fracture or subluxation is identified. There is multilevel degenerative disc disease which is most severe from 3 to C7. There is bilateral degenerative facet arthropathy involving multiple levels. Paraspinous soft tissues are unremarkable.  Degenerative endplate spurring gives rise to spinal canal stenoses at multiple levels. Stenosis is severe at C4-5 and moderate at C3-4, C5-6 and C6-7. There are multilevel intervertebral foraminal stenoses from spurring. The most severely involved levels are C4-C7 bilaterally. No acute fracture or dislocation. Degenerative spondylotic changes with multilevel spinal canalicular stenoses. Most severe is C4-5. Multilevel intervertebral foraminal stenoses from spurring. Xr Chest Portable    Result Date: 2020  Patient MRN: [id number] : 1945 Age:  70 years Gender: Male Order Date: 2020 3:00 AM Exam: XR CHEST PORTABLE Number of Images: 1 view Indication: Discovered unconscious. Comparison: None. FINDINGS: There are streaky opacities at the apices which are likely related to scarring. Heart and pulmonary vascularity are normal. Neither costophrenic angle appears blunted. Likely biapical scarring. Nothing else active.        Discharge Medications:      Medication List      START taking these medications    folic acid 1 MG tablet  Commonly known as:  FOLVITE  Take 1 tablet by mouth daily  Start taking on:  2020     levETIRAcetam 500 MG tablet  Commonly known as:  KEPPRA  Take 1 tablet by mouth 2 times daily     tamsulosin 0.4 MG capsule  Commonly known as:  FLOMAX  Take 1 capsule by mouth daily  Start taking on:  2020     thiamine 100 MG tablet  Take 1 tablet by mouth daily  Start taking on:  2020        Juvenal Reynolds taking these medications    hydroCHLOROthiazide 12.5 MG capsule  Commonly known as:  MICROZIDE     lisinopril 5 MG tablet  Commonly known as:  PRINIVIL;ZESTRIL           Where to Get Your Medications      These medications were sent to [facility] [address] [address]    Phone:  [phone]   · folic acid 1 MG tablet  · levETIRAcetam 500 MG tablet  · tamsulosin 0.4 MG capsule  · thiamine 100 MG tablet         Time Spent on discharge is more than 35 minutes in the examination, evaluation, counseling and review of medications and discharge plan.    +++++++++++++++++++++++++++++++++++++++++++++++++  [address], [place]  +++++++++++++++++++++++++++++++++++++++++++++++++  NOTE: This report was transcribed using voice recognition software. Every effort was made to ensure accuracy; however, inadvertent computerized transcription errors may be present.

## 2020-07-07 NOTE — PROGRESS NOTES
The patient may be discharged from neurosurgical standpoint. Follow up in the office in 3-4 weeks.  [phone]

## 2020-07-07 NOTE — PROGRESS NOTES
CLINICAL PHARMACY NOTE: MEDS TO [address] Select Patient?: No  Total # of Prescriptions Filled: 4   The following medications were delivered to the patient:  · Vitamin b1  · Folic acid 1 mg  · levetiracetam 500 mg  · tamsulosin 0.4 mg  Total # of Interventions Completed: 4  Time Spent (min): 15    Additional Documentation:

## 2020-07-22 NOTE — CONSULTS
The patient was found unconscious . Examined the patient. Reviewed CT scan of brain  Large left subdural hematoma. No anticoagulants. Plan to evacuate subdural hematoma.   Dx:Large left subdural hematoma with midline shift

## (undated) DEVICE — PRECISION ACORN

## (undated) DEVICE — DRAINAGE ACCESSORY KIT: 500 ML DRAINAGE BAG FOR INTRAOPERATIVE DRAINAGE OF CEREBROSPINAL FLUID.: Brand: DRAINAGE ACCESSORY KIT

## (undated) DEVICE — DOUBLE BASIN SET: Brand: MEDLINE INDUSTRIES, INC.

## (undated) DEVICE — DRILL, WL 4MM, STRYKER SHAFT

## (undated) DEVICE — Device

## (undated) DEVICE — CLOTH SURG PREP PREOPERATIVE CHLORHEXIDINE GLUC 2% READYPREP

## (undated) DEVICE — LABEL MED 4 IN SURG PANEL W/ PEN STRL

## (undated) DEVICE — SYRINGE,EAR/ULCER, 3 OZ, STERILE: Brand: MEDLINE

## (undated) DEVICE — 1000 S-DRAPE TOWEL DRAPE 10/BX: Brand: STERI-DRAPE™

## (undated) DEVICE — LARGE BORE STOPCOCK WITH ROTATING MALE LUER LOCK

## (undated) DEVICE — SOLUTION IV IRRIG WATER 1000ML POUR BRL 2F7114

## (undated) DEVICE — BLADE CLP TAPR HD WET DRY CAPABILITY GTT IN CHARGING USE

## (undated) DEVICE — GLOVE SURG SZ 65 THK91MIL LTX FREE SYN POLYISOPRENE

## (undated) DEVICE — NEEDLE HYPO 25GA L0.625IN BLU POLYPR HUB S STL REG BVL STR

## (undated) DEVICE — GAUZE,SPONGE,AVANT,4"X4",4PLY,STRL,10/TR: Brand: MEDLINE

## (undated) DEVICE — PAD,NON-ADHERENT,3X8,STERILE,LF,1/PK: Brand: MEDLINE

## (undated) DEVICE — 1.5L THIN WALL CAN: Brand: CRD

## (undated) DEVICE — INCISE SURGICAL DRAPE: Brand: OPSITE INCISE 15X28CM CTN 10

## (undated) DEVICE — GOWN,SIRUS,FABRNF,L,20/CS: Brand: MEDLINE

## (undated) DEVICE — 20 CM RADIOPAQUE STRAIGHT VENTRICULAR CATHETER WITH LARGE HOLES AND GRAPHITE BASED LENGTH MARKS AT 5, 10, 15 CM FROM THE TIP.: Brand: VENTRICULAR CATHETER

## (undated) DEVICE — SURGICAL PROCEDURE PACK CRANIOTOMY CUST